# Patient Record
Sex: FEMALE | Race: WHITE | Employment: OTHER | URBAN - METROPOLITAN AREA
[De-identification: names, ages, dates, MRNs, and addresses within clinical notes are randomized per-mention and may not be internally consistent; named-entity substitution may affect disease eponyms.]

---

## 2020-11-03 ENCOUNTER — HOSPITAL ENCOUNTER (INPATIENT)
Facility: CLINIC | Age: 75
LOS: 7 days | Discharge: HOME | DRG: 871 | End: 2020-11-10
Attending: EMERGENCY MEDICINE | Admitting: INTERNAL MEDICINE
Payer: MEDICARE

## 2020-11-03 ENCOUNTER — APPOINTMENT (OUTPATIENT)
Dept: CARDIOLOGY | Facility: CLINIC | Age: 75
DRG: 871 | End: 2020-11-03
Attending: INTERNAL MEDICINE
Payer: MEDICARE

## 2020-11-03 ENCOUNTER — APPOINTMENT (OUTPATIENT)
Dept: GENERAL RADIOLOGY | Facility: CLINIC | Age: 75
DRG: 871 | End: 2020-11-03
Attending: EMERGENCY MEDICINE
Payer: MEDICARE

## 2020-11-03 DIAGNOSIS — I21.4 NSTEMI (NON-ST ELEVATED MYOCARDIAL INFARCTION) (H): ICD-10-CM

## 2020-11-03 LAB
ALBUMIN SERPL-MCNC: 3.6 G/DL (ref 3.4–5)
ALP SERPL-CCNC: 53 U/L (ref 40–150)
ALT SERPL W P-5'-P-CCNC: 18 U/L (ref 0–50)
ANION GAP SERPL CALCULATED.3IONS-SCNC: 8 MMOL/L (ref 3–14)
APTT PPP: 26 SEC (ref 22–37)
APTT PPP: 61 SEC (ref 22–37)
AST SERPL W P-5'-P-CCNC: 18 U/L (ref 0–45)
BASOPHILS # BLD AUTO: 0 10E9/L (ref 0–0.2)
BASOPHILS NFR BLD AUTO: 0.4 %
BILIRUB SERPL-MCNC: 0.4 MG/DL (ref 0.2–1.3)
BUN SERPL-MCNC: 25 MG/DL (ref 7–30)
CALCIUM SERPL-MCNC: 9 MG/DL (ref 8.5–10.1)
CHLORIDE SERPL-SCNC: 106 MMOL/L (ref 94–109)
CO2 SERPL-SCNC: 27 MMOL/L (ref 20–32)
CREAT SERPL-MCNC: 1.06 MG/DL (ref 0.52–1.04)
CRP SERPL-MCNC: 8.7 MG/L (ref 0–8)
D DIMER PPP FEU-MCNC: 0.7 UG/ML FEU (ref 0–0.5)
DIFFERENTIAL METHOD BLD: NORMAL
EOSINOPHIL # BLD AUTO: 0.1 10E9/L (ref 0–0.7)
EOSINOPHIL NFR BLD AUTO: 0.5 %
ERYTHROCYTE [DISTWIDTH] IN BLOOD BY AUTOMATED COUNT: 12.6 % (ref 10–15)
ERYTHROCYTE [DISTWIDTH] IN BLOOD BY AUTOMATED COUNT: 12.9 % (ref 10–15)
FERRITIN SERPL-MCNC: 42 NG/ML (ref 8–252)
GFR SERPL CREATININE-BSD FRML MDRD: 51 ML/MIN/{1.73_M2}
GLUCOSE SERPL-MCNC: 123 MG/DL (ref 70–99)
HCT VFR BLD AUTO: 37 % (ref 35–47)
HCT VFR BLD AUTO: 43.1 % (ref 35–47)
HGB BLD-MCNC: 12.1 G/DL (ref 11.7–15.7)
HGB BLD-MCNC: 13.6 G/DL (ref 11.7–15.7)
IMM GRANULOCYTES # BLD: 0 10E9/L (ref 0–0.4)
IMM GRANULOCYTES NFR BLD: 0.2 %
INTERPRETATION ECG - MUSE: NORMAL
LABORATORY COMMENT REPORT: NORMAL
LDH SERPL L TO P-CCNC: 190 U/L (ref 81–234)
LIPASE SERPL-CCNC: 119 U/L (ref 73–393)
LYMPHOCYTES # BLD AUTO: 1 10E9/L (ref 0.8–5.3)
LYMPHOCYTES NFR BLD AUTO: 10.2 %
MCH RBC QN AUTO: 28.4 PG (ref 26.5–33)
MCH RBC QN AUTO: 28.7 PG (ref 26.5–33)
MCHC RBC AUTO-ENTMCNC: 31.6 G/DL (ref 31.5–36.5)
MCHC RBC AUTO-ENTMCNC: 32.7 G/DL (ref 31.5–36.5)
MCV RBC AUTO: 88 FL (ref 78–100)
MCV RBC AUTO: 90 FL (ref 78–100)
MONOCYTES # BLD AUTO: 0.4 10E9/L (ref 0–1.3)
MONOCYTES NFR BLD AUTO: 3.6 %
NEUTROPHILS # BLD AUTO: 8.2 10E9/L (ref 1.6–8.3)
NEUTROPHILS NFR BLD AUTO: 85.1 %
NRBC # BLD AUTO: 0 10*3/UL
NRBC BLD AUTO-RTO: 0 /100
PLATELET # BLD AUTO: 251 10E9/L (ref 150–450)
PLATELET # BLD AUTO: 269 10E9/L (ref 150–450)
POTASSIUM SERPL-SCNC: 4 MMOL/L (ref 3.4–5.3)
PROCALCITONIN SERPL-MCNC: 2.42 NG/ML
PROT SERPL-MCNC: 7.4 G/DL (ref 6.8–8.8)
RBC # BLD AUTO: 4.21 10E12/L (ref 3.8–5.2)
RBC # BLD AUTO: 4.79 10E12/L (ref 3.8–5.2)
SARS-COV-2 RNA SPEC QL NAA+PROBE: NEGATIVE
SARS-COV-2 RNA SPEC QL NAA+PROBE: NORMAL
SODIUM SERPL-SCNC: 141 MMOL/L (ref 133–144)
SPECIMEN SOURCE: NORMAL
SPECIMEN SOURCE: NORMAL
TROPONIN I SERPL-MCNC: 0.79 UG/L (ref 0–0.04)
TROPONIN I SERPL-MCNC: 5.61 UG/L (ref 0–0.04)
TROPONIN I SERPL-MCNC: 8.36 UG/L (ref 0–0.04)
TROPONIN I SERPL-MCNC: 8.48 UG/L (ref 0–0.04)
TROPONIN I SERPL-MCNC: 8.54 UG/L (ref 0–0.04)
UFH PPP CHRO-ACNC: 0.37 IU/ML
UFH PPP CHRO-ACNC: 0.4 IU/ML
UFH PPP CHRO-ACNC: 0.63 IU/ML
WBC # BLD AUTO: 8 10E9/L (ref 4–11)
WBC # BLD AUTO: 9.6 10E9/L (ref 4–11)

## 2020-11-03 PROCEDURE — 85027 COMPLETE CBC AUTOMATED: CPT | Performed by: INTERNAL MEDICINE

## 2020-11-03 PROCEDURE — 36415 COLL VENOUS BLD VENIPUNCTURE: CPT | Performed by: HOSPITALIST

## 2020-11-03 PROCEDURE — 80053 COMPREHEN METABOLIC PANEL: CPT | Performed by: EMERGENCY MEDICINE

## 2020-11-03 PROCEDURE — 85379 FIBRIN DEGRADATION QUANT: CPT | Performed by: EMERGENCY MEDICINE

## 2020-11-03 PROCEDURE — 71045 X-RAY EXAM CHEST 1 VIEW: CPT

## 2020-11-03 PROCEDURE — 96365 THER/PROPH/DIAG IV INF INIT: CPT

## 2020-11-03 PROCEDURE — 250N000013 HC RX MED GY IP 250 OP 250 PS 637: Performed by: EMERGENCY MEDICINE

## 2020-11-03 PROCEDURE — 85520 HEPARIN ASSAY: CPT | Performed by: INTERNAL MEDICINE

## 2020-11-03 PROCEDURE — 120N000001 HC R&B MED SURG/OB

## 2020-11-03 PROCEDURE — 85520 HEPARIN ASSAY: CPT | Performed by: EMERGENCY MEDICINE

## 2020-11-03 PROCEDURE — 36415 COLL VENOUS BLD VENIPUNCTURE: CPT | Performed by: INTERNAL MEDICINE

## 2020-11-03 PROCEDURE — 86140 C-REACTIVE PROTEIN: CPT | Performed by: EMERGENCY MEDICINE

## 2020-11-03 PROCEDURE — 250N000013 HC RX MED GY IP 250 OP 250 PS 637: Performed by: INTERNAL MEDICINE

## 2020-11-03 PROCEDURE — 93005 ELECTROCARDIOGRAM TRACING: CPT

## 2020-11-03 PROCEDURE — 93306 TTE W/DOPPLER COMPLETE: CPT | Mod: 26 | Performed by: INTERNAL MEDICINE

## 2020-11-03 PROCEDURE — 87186 SC STD MICRODIL/AGAR DIL: CPT | Performed by: HOSPITALIST

## 2020-11-03 PROCEDURE — U0003 INFECTIOUS AGENT DETECTION BY NUCLEIC ACID (DNA OR RNA); SEVERE ACUTE RESPIRATORY SYNDROME CORONAVIRUS 2 (SARS-COV-2) (CORONAVIRUS DISEASE [COVID-19]), AMPLIFIED PROBE TECHNIQUE, MAKING USE OF HIGH THROUGHPUT TECHNOLOGIES AS DESCRIBED BY CMS-2020-01-R: HCPCS | Performed by: EMERGENCY MEDICINE

## 2020-11-03 PROCEDURE — 82728 ASSAY OF FERRITIN: CPT | Performed by: EMERGENCY MEDICINE

## 2020-11-03 PROCEDURE — 84484 ASSAY OF TROPONIN QUANT: CPT | Performed by: INTERNAL MEDICINE

## 2020-11-03 PROCEDURE — 96375 TX/PRO/DX INJ NEW DRUG ADDON: CPT

## 2020-11-03 PROCEDURE — 84145 PROCALCITONIN (PCT): CPT | Performed by: HOSPITALIST

## 2020-11-03 PROCEDURE — 36415 COLL VENOUS BLD VENIPUNCTURE: CPT | Performed by: EMERGENCY MEDICINE

## 2020-11-03 PROCEDURE — 84484 ASSAY OF TROPONIN QUANT: CPT | Performed by: HOSPITALIST

## 2020-11-03 PROCEDURE — 258N000003 HC RX IP 258 OP 636: Performed by: EMERGENCY MEDICINE

## 2020-11-03 PROCEDURE — 83615 LACTATE (LD) (LDH) ENZYME: CPT | Performed by: EMERGENCY MEDICINE

## 2020-11-03 PROCEDURE — 83690 ASSAY OF LIPASE: CPT | Performed by: EMERGENCY MEDICINE

## 2020-11-03 PROCEDURE — 85730 THROMBOPLASTIN TIME PARTIAL: CPT | Performed by: INTERNAL MEDICINE

## 2020-11-03 PROCEDURE — 84484 ASSAY OF TROPONIN QUANT: CPT | Performed by: EMERGENCY MEDICINE

## 2020-11-03 PROCEDURE — 96366 THER/PROPH/DIAG IV INF ADDON: CPT

## 2020-11-03 PROCEDURE — 96361 HYDRATE IV INFUSION ADD-ON: CPT

## 2020-11-03 PROCEDURE — 250N000011 HC RX IP 250 OP 636: Performed by: EMERGENCY MEDICINE

## 2020-11-03 PROCEDURE — 96376 TX/PRO/DX INJ SAME DRUG ADON: CPT

## 2020-11-03 PROCEDURE — 85520 HEPARIN ASSAY: CPT | Performed by: HOSPITALIST

## 2020-11-03 PROCEDURE — 85025 COMPLETE CBC W/AUTO DIFF WBC: CPT | Performed by: EMERGENCY MEDICINE

## 2020-11-03 PROCEDURE — 99222 1ST HOSP IP/OBS MODERATE 55: CPT | Mod: 25 | Performed by: INTERNAL MEDICINE

## 2020-11-03 PROCEDURE — C9803 HOPD COVID-19 SPEC COLLECT: HCPCS

## 2020-11-03 PROCEDURE — 87040 BLOOD CULTURE FOR BACTERIA: CPT | Performed by: HOSPITALIST

## 2020-11-03 PROCEDURE — 99223 1ST HOSP IP/OBS HIGH 75: CPT | Mod: AI | Performed by: INTERNAL MEDICINE

## 2020-11-03 PROCEDURE — 99285 EMERGENCY DEPT VISIT HI MDM: CPT | Mod: 25

## 2020-11-03 PROCEDURE — 250N000011 HC RX IP 250 OP 636: Performed by: HOSPITALIST

## 2020-11-03 PROCEDURE — 87800 DETECT AGNT MULT DNA DIREC: CPT | Performed by: HOSPITALIST

## 2020-11-03 PROCEDURE — 255N000002 HC RX 255 OP 636: Performed by: EMERGENCY MEDICINE

## 2020-11-03 PROCEDURE — 250N000013 HC RX MED GY IP 250 OP 250 PS 637: Performed by: HOSPITALIST

## 2020-11-03 PROCEDURE — 85730 THROMBOPLASTIN TIME PARTIAL: CPT | Performed by: EMERGENCY MEDICINE

## 2020-11-03 PROCEDURE — 87077 CULTURE AEROBIC IDENTIFY: CPT | Performed by: HOSPITALIST

## 2020-11-03 RX ORDER — IBUPROFEN 600 MG/1
600 TABLET, FILM COATED ORAL EVERY 6 HOURS PRN
Status: ON HOLD | COMMUNITY
End: 2020-11-10

## 2020-11-03 RX ORDER — ROSUVASTATIN CALCIUM 20 MG/1
40 TABLET, COATED ORAL DAILY
Status: DISCONTINUED | OUTPATIENT
Start: 2020-11-03 | End: 2020-11-10 | Stop reason: HOSPADM

## 2020-11-03 RX ORDER — ONDANSETRON 2 MG/ML
4 INJECTION INTRAMUSCULAR; INTRAVENOUS EVERY 6 HOURS PRN
Status: DISCONTINUED | OUTPATIENT
Start: 2020-11-03 | End: 2020-11-10 | Stop reason: HOSPADM

## 2020-11-03 RX ORDER — ACETAMINOPHEN 325 MG/10.15ML
650 LIQUID ORAL EVERY 4 HOURS PRN
Status: DISCONTINUED | OUTPATIENT
Start: 2020-11-03 | End: 2020-11-10 | Stop reason: HOSPADM

## 2020-11-03 RX ORDER — ONDANSETRON 2 MG/ML
4 INJECTION INTRAMUSCULAR; INTRAVENOUS ONCE
Status: COMPLETED | OUTPATIENT
Start: 2020-11-03 | End: 2020-11-03

## 2020-11-03 RX ORDER — ONDANSETRON 4 MG/1
4 TABLET, ORALLY DISINTEGRATING ORAL EVERY 6 HOURS PRN
Status: DISCONTINUED | OUTPATIENT
Start: 2020-11-03 | End: 2020-11-10 | Stop reason: HOSPADM

## 2020-11-03 RX ORDER — HEPARIN SODIUM 10000 [USP'U]/100ML
0-5000 INJECTION, SOLUTION INTRAVENOUS CONTINUOUS
Status: DISCONTINUED | OUTPATIENT
Start: 2020-11-03 | End: 2020-11-06

## 2020-11-03 RX ORDER — METOPROLOL TARTRATE 25 MG/1
12.5 TABLET, FILM COATED ORAL 2 TIMES DAILY
Status: ON HOLD | COMMUNITY
End: 2020-11-10

## 2020-11-03 RX ORDER — HEPARIN SODIUM 10000 [USP'U]/100ML
0-5000 INJECTION, SOLUTION INTRAVENOUS CONTINUOUS
Status: DISCONTINUED | OUTPATIENT
Start: 2020-11-03 | End: 2020-11-03

## 2020-11-03 RX ORDER — AMOXICILLIN 250 MG
2 CAPSULE ORAL 2 TIMES DAILY
Status: DISCONTINUED | OUTPATIENT
Start: 2020-11-03 | End: 2020-11-10 | Stop reason: HOSPADM

## 2020-11-03 RX ORDER — CYCLOBENZAPRINE HCL 10 MG
10 TABLET ORAL DAILY PRN
COMMUNITY

## 2020-11-03 RX ORDER — METOPROLOL TARTRATE 25 MG/1
25 TABLET, FILM COATED ORAL 2 TIMES DAILY
Status: DISCONTINUED | OUTPATIENT
Start: 2020-11-03 | End: 2020-11-04

## 2020-11-03 RX ORDER — ASPIRIN 81 MG/1
81 TABLET ORAL DAILY
Status: DISCONTINUED | OUTPATIENT
Start: 2020-11-04 | End: 2020-11-03 | Stop reason: ALTCHOICE

## 2020-11-03 RX ORDER — DOCUSATE SODIUM 100 MG/1
100 CAPSULE, LIQUID FILLED ORAL DAILY PRN
COMMUNITY

## 2020-11-03 RX ORDER — LISINOPRIL 40 MG/1
40 TABLET ORAL DAILY
Status: DISCONTINUED | OUTPATIENT
Start: 2020-11-03 | End: 2020-11-04

## 2020-11-03 RX ORDER — LISINOPRIL 40 MG/1
40 TABLET ORAL DAILY
Status: ON HOLD | COMMUNITY
End: 2020-11-10

## 2020-11-03 RX ORDER — ASPIRIN 81 MG/1
324 TABLET, CHEWABLE ORAL ONCE
Status: COMPLETED | OUTPATIENT
Start: 2020-11-03 | End: 2020-11-03

## 2020-11-03 RX ORDER — LIDOCAINE 40 MG/G
CREAM TOPICAL
Status: DISCONTINUED | OUTPATIENT
Start: 2020-11-03 | End: 2020-11-07

## 2020-11-03 RX ORDER — POLYETHYLENE GLYCOL 3350 17 G/17G
17 POWDER, FOR SOLUTION ORAL DAILY
Status: DISCONTINUED | OUTPATIENT
Start: 2020-11-03 | End: 2020-11-10 | Stop reason: HOSPADM

## 2020-11-03 RX ORDER — AMOXICILLIN 250 MG
1 CAPSULE ORAL 2 TIMES DAILY
Status: DISCONTINUED | OUTPATIENT
Start: 2020-11-03 | End: 2020-11-10 | Stop reason: HOSPADM

## 2020-11-03 RX ORDER — CEFTRIAXONE 1 G/1
1 INJECTION, POWDER, FOR SOLUTION INTRAMUSCULAR; INTRAVENOUS EVERY 24 HOURS
Status: DISCONTINUED | OUTPATIENT
Start: 2020-11-03 | End: 2020-11-04

## 2020-11-03 RX ORDER — ACETAMINOPHEN 325 MG/10.15ML
640 LIQUID ORAL ONCE
Status: COMPLETED | OUTPATIENT
Start: 2020-11-03 | End: 2020-11-03

## 2020-11-03 RX ADMIN — ACETAMINOPHEN 650 MG: 160 LIQUID ORAL at 20:41

## 2020-11-03 RX ADMIN — ASPIRIN 325 MG: 325 TABLET, COATED ORAL at 17:08

## 2020-11-03 RX ADMIN — METOPROLOL TARTRATE 25 MG: 25 TABLET, FILM COATED ORAL at 20:41

## 2020-11-03 RX ADMIN — ROSUVASTATIN CALCIUM 40 MG: 20 TABLET, FILM COATED ORAL at 16:11

## 2020-11-03 RX ADMIN — CEFTRIAXONE 1 G: 1 INJECTION, POWDER, FOR SOLUTION INTRAMUSCULAR; INTRAVENOUS at 16:29

## 2020-11-03 RX ADMIN — HUMAN ALBUMIN MICROSPHERES AND PERFLUTREN 3 ML: 10; .22 INJECTION, SOLUTION INTRAVENOUS at 08:20

## 2020-11-03 RX ADMIN — ASPIRIN 324 MG: 81 TABLET, CHEWABLE ORAL at 01:36

## 2020-11-03 RX ADMIN — HEPARIN SODIUM 1000 UNITS/HR: 10000 INJECTION, SOLUTION INTRAVENOUS at 01:34

## 2020-11-03 RX ADMIN — SODIUM CHLORIDE 1000 ML: 9 INJECTION, SOLUTION INTRAVENOUS at 01:12

## 2020-11-03 RX ADMIN — ACETAMINOPHEN 640 MG: 325 SOLUTION ORAL at 01:12

## 2020-11-03 RX ADMIN — ONDANSETRON 4 MG: 2 INJECTION INTRAMUSCULAR; INTRAVENOUS at 01:12

## 2020-11-03 RX ADMIN — LISINOPRIL 40 MG: 40 TABLET ORAL at 16:11

## 2020-11-03 ASSESSMENT — ACTIVITIES OF DAILY LIVING (ADL)
FALL_HISTORY_WITHIN_LAST_SIX_MONTHS: NO
ADLS_ACUITY_SCORE: 14
ADLS_ACUITY_SCORE: 16

## 2020-11-03 ASSESSMENT — ENCOUNTER SYMPTOMS
SHORTNESS OF BREATH: 0
DIARRHEA: 0
FEVER: 0
VOMITING: 1
NAUSEA: 1
COUGH: 0

## 2020-11-03 ASSESSMENT — MIFFLIN-ST. JEOR: SCORE: 1333.2

## 2020-11-03 NOTE — ED TRIAGE NOTES
Pt arrives via EMS with nausea and fever. Pt was eating spicy food at dinner and started having upper abdominal pain and chills. Given 4mg zofran en route. VSS, A&O x4.

## 2020-11-03 NOTE — PLAN OF CARE
Diagnosis. NSTEMI  History. CAD, HTN, Hyperlip  Labs/Protocol. Troponin 8.485,8.544 MD paged. No action necessary. Patient on Heparin and scheduled for angiogram tomorrow..   Vitals. VSS  Tele. SR with long QT  Respiratory. WDL  Neuro. WDL  GI/. WDL  Skin. Patient   LDA's. Left PIV CDI. Infusing Heparin at 800 units/hr. Hep 10A 0.37. Maintained same dose per protocol.   Diet. Low Sat fat <2400mg.   Activity. Assist of 1 with GB.   Plan. Patient planning to have angiogram tomorrow. NPO at midnight. Patient received dose of aspirin tonight in preparation. Continue to monitor Troponins and Hep10A.     Paged MD:  FYI: Patients Troponin 8.485  FYI: Patients Troponin 8.544

## 2020-11-03 NOTE — ED NOTES
Patient up to bedside commode with SBA. Echo in room to complete. Pt denies chest pain. Breathing WNL.

## 2020-11-03 NOTE — ED NOTES
Per Dr. Contreras pt ok to drink a little  Patient supplied with water, some ice chips.  Also requesting chapstick, and feminine pads.     Commode at bedside

## 2020-11-03 NOTE — ED NOTES
Patient complaining of increased chills. Temperature 98.2 orally. Bear hugger applied. Cardiologist in room to assess patient. After about 20 minutes, patient was feeling better and warmed up. VSS. Denies chest pain. Per cardiologist, patient okay to have some water. Water given, pillows adjusted.

## 2020-11-03 NOTE — ED NOTES
DATE:  11/3/2020   TIME OF RECEIPT FROM LAB:  0123  LAB TEST:  Troponin  LAB VALUE:  0.791  RESULTS GIVEN WITH READ-BACK TO (PROVIDER):  Haap  TIME LAB VALUE REPORTED TO PROVIDER:   0124

## 2020-11-03 NOTE — PROGRESS NOTES
"Brief Progress Note:    75-year-old female with known CAD, hypertension, hyperlipidemia who presents with shaking chills and nausea.  Symptoms started about an hour prior to going to the emergency room.  Patient does endorse increased frequency of urination but no dysuria.  EKG done shows no acute ST elevation but troponin uptrending.  She does not have any chest pain.  Does not feel like symptoms she had with prior MI.  She did last have a coronary angiogram in 2014 that showed nonocclusive disease.    Vitals:    11/03/20 1300 11/03/20 1315 11/03/20 1330 11/03/20 1354   BP: 139/68 128/66 (!) 142/75 (!) 144/79   BP Location:    Right arm   Pulse: 79 79 78 80   Resp:   18 18   Temp:    100.2  F (37.9  C)   TempSrc:    Axillary   SpO2: 96% 96% 98% 97%   Weight:    79.5 kg (175 lb 3.2 oz)   Height:    1.727 m (5' 8\")     Exam, patient appears fatigued.  Heart is regular with no murmur.  Lungs are clear.  Abdomen is soft.  No CVA tenderness.  Extremities warm and well-perfused.  Neuro exam is nonfocal.    Chest x-ray showed no acute CV process.  CBC shows no leukocytosis.  BMP unremarkable.  COVID-19 is negative.    TTE shows EF 45 to 50% with moderate to severe hypokinesis of the mid anteroseptal walls and mid to apical anterior walls.  RV systolic function is normal.    Plan:  -Treating for ACS.  Heparin drip, aspirin, statin, beta-blockade.  Continuing home lisinopril.  Cards consulted.  They are planning on cath tomorrow.  N.p.o. at midnight.  Continue telemetry.  -I have ordered a UA.  Cultures.  I have also ordered a procalcitonin.  May be a viral infection at play.  Give a single dose of ceftriaxone and follow-up on procalcitonin, UA.    Helder Farooq MD    "

## 2020-11-03 NOTE — ED PROVIDER NOTES
History     Chief Complaint:  Nausea and Fever       HPI   Hue Montanez is a 75 year old female with a history of MI, hypertension, hyperlipidemia, and GERD who presents with nausea and fever. The patient was eating spicy food at dinner last night when about 2 hours later she started having upper abdominal pain and chills. EMS was called and the patient was subsequently transferred to the ED. She was given 4 mg zofran en route.  Here, she notes that the chills made her shake, but she denies feeling like she had a fever.  She denies cough, but admits to some congestion.  She reports a history of MI that was treated at [facility] through the [facility] system, but that her cardiac cath at the time did not show any blockages.    Allergies:  Chlorthalidone   Lactose     Medications:    Lisinopril   Metoprolol   Hydrochlorothiazide     Past Medical History:    Pure hypercholesterolemia   GERD  Chronic back pain   IBS  Hypertension   MI    Past Surgical History:    Lumbar spine cord surgery   Appendectomy   Cholecystectomy   NADER and BSP  Lipectomy   Eye surgery     Family History:    Cancer   Diabetes   Hypertension   Heart disease     Social History:  Smoking Status: Never Smoker  Smokeless Tobacco: Never Used  Alcohol Use: No    Review of Systems   Constitutional: Negative for fever.   HENT: Positive for congestion.    Respiratory: Negative for cough and shortness of breath.    Cardiovascular: Negative for chest pain.   Gastrointestinal: Positive for nausea and vomiting. Negative for diarrhea.   All other systems reviewed and are negative.      Physical Exam     Patient Vitals for the past 24 hrs:   BP Temp Temp src Pulse Resp SpO2 Weight   11/03/20 0350 -- -- -- 73 13 96 % --   11/03/20 0335 105/65 -- -- 75 -- 96 % --   11/03/20 0320 -- -- -- 86 -- -- --   11/03/20 0145 138/78 -- -- 101 -- 95 % --   11/03/20 0130 -- -- -- 101 -- -- --   11/03/20 0115 -- -- -- 99 28 94 % --   11/03/20 0100 -- -- -- 98 21 93 % --    11/03/20 0045 (!) 165/86 -- -- 98 13 92 % --   11/03/20 0040 (!) 165/86 99.3  F (37.4  C) Oral 106 18 94 % 83 kg (183 lb)        Physical Exam  Nursing note and vitals reviewed.  Constitutional: Cooperative.   HENT:   Mouth/Throat: Moist mucous membranes.   Eyes: EOMI, nonicteric sclera  Cardiovascular: Normal rate, regular rhythm, no murmurs, rubs, or gallops  Pulmonary/Chest: Effort normal and breath sounds normal. No respiratory distress. No wheezes. No rales.   Abdominal: Soft. Nontender, nondistended, no guarding or rigidity.   Musculoskeletal: Normal range of motion.   Neurological: Alert. Moves all extremities spontaneously.   Skin: Skin is warm and dry. No rash noted.   Psychiatric: Normal mood and affect.       Emergency Department Course   ECG:  ECG taken at 0041, ECG read at 0126  Sinus tachycardia   Minimal voltage criteria for LVH, may be normal variant   Nonspecific ST and T wave abnormality   Abnormal ECG  Rate 102 bpm. SD interval 166 ms. QRS duration 78 ms. QT/QTc 352/458 ms. P-R-T axes 59 -4 81.     Imaging:  Radiology findings were communicated with the patient who voiced understanding of the findings.    XR Chest Port 1 View  Final Result  IMPRESSION: Cardiomediastinal silhouette is within normal limits. Slight elevation left hemidiaphragm. No focal consolidation or pleural effusion.  Reading per radiology     Laboratory:  Laboratory findings were communicated with the patient who voiced understanding of the findings.    CMP: Glucose 123 (H), GFR 51 (L), o/w WNL (Creatinine: 1.06 (H))     Lipase: 119     CBC:  WBC 9.6, HGB 12.1, , o/w WNL       Troponin(0045):  0.791 (HH)    Symptomatic COVID-19 Virus (Coronavirus) by PCR Nasopharyngeal swab: pending      PTT: 26    D dimer quantitative: 0.7 (H)      CRP inflammation: 8.7 (H)    Ferritin: 42    Lactate dehydrogenase: 190     Interventions:  Medications   heparin 25,000 units in 0.45% NaCl 250 mL ANTICOAGULANT  infusion (1,000 Units/hr  Intravenous Rate/Dose Verify 11/3/20 0350)   0.9% sodium chloride BOLUS (0 mLs Intravenous Stopped 11/3/20 0334)   ondansetron (ZOFRAN) injection 4 mg (4 mg Intravenous Given 11/3/20 0112)   acetaminophen (TYLENOL) solution 640 mg (640 mg Oral Given 11/3/20 0112)   aspirin (ASA) chewable tablet 324 mg (324 mg Oral Given 11/3/20 0136)   heparin loading dose for LOW INTENSITY TREATMENT * Give BEFORE starting heparin infusion (5,000 Units Intravenous Given 11/3/20 0133)      Emergency Department Course:  Past medical records, nursing notes, and vitals reviewed.    0047 I performed an exam of the patient as documented above.    IV was inserted and blood was drawn for laboratory testing, results above.      0130 Patient rechecked and updated.     The patient was sent for imaging while in the emergency department, results above.       0216 I spoke with Dr. Soliz of the Hospitalist service from [facility] regarding patient's presentation, findings, and plan of care.      0316 I spoke with Dr. Fregoso of the Hospitalist service from [facility] regarding patient's presentation, findings, and plan of care.       Findings and plan explained to the Patient who consents to admission. Discussed the patient with Dr. Fregoso, who will admit the patient to a cardiac tele bed for further monitoring, evaluation, and treatment.        Impression & Plan   Covid-19  Hue Montanez was evaluated during a global COVID-19 pandemic, which necessitated consideration that the patient might be at risk for infection with the SARS-CoV-2 virus that causes COVID-19.   Applicable protocols for evaluation were followed during the patient's care.   COVID-19 was considered as part of the patient's evaluation. The plan for testing is:  a test was obtained during this visit.      Medical Decision Making:  Hue Montanez is a 75 year old female who presents to the emergency department today with chief complaint of nausea and chills.  Broad  differential considered.  She had nonspecific EKG changes.  Troponin was elevated suggesting NSTEMI.  Also considered that this could be COVID-19.  Using age-adjusted cut off, D-dimer is negative.  Chest x-ray shows no infiltrates or other source of symptoms.  Patient was given aspirin and initiated on heparin.  She is largely pain-free here in ED.  Discussed the above with her and she voices understanding and all questions were answered.  Discussed with Dr. Fregoso from our hospitalist service who accepts her for admission.  She is admitted in stable condition.      Discharge Diagnosis:    ICD-10-CM    1. NSTEMI (non-ST elevated myocardial infarction) (H)  I21.4 Symptomatic COVID-19 Virus (Coronavirus) by PCR       Disposition:  Admitted.     Scribe Disclosure:  Manish WING, am serving as a scribe at 12:47 AM on 11/3/2020 to document services personally performed by Ezequiel Contreras MD based on my observations and the provider's statements to me.      11/3/2020   Ezequiel Contreras MD [place], Lucas Ray, MD  11/03/20 0814

## 2020-11-03 NOTE — CONSULTS
Cardiology consultation dictated    Assessment : NSTEMI in setting of nausea, chills and malaise. Unusual presentation as NO chest pain. Hemodynamically stable    Recommendations  1) Randell with IVUFH  2) carvedilol 12.5 bid  3) lisinopril 20 mg daily  4) atorvastatin 40 mg daily  5) evaluate for systemic illness eg UTI or infectious source    Eventual coronary angiogram with possible PCI depending upon her course. Would avoid today until we clarify whether she has an infection unless unstable.    I have examined the patient, reviewed the history, medications and pre procedural tests. I have explained to the patient the risks of death, MI, stroke, hematoma, possible urgent bypass surgery for failed PCI, use of stents, thienopyridine agents, possible peripheral vascular complications, arrhythmia, the use of FFR in clinical decision-making and alternative of medical therapy alone in regards to left heart catheterization, left ventriculography, coronary angiography, and possible percutaneous coronary intervention. The patient voiced understanding and wishes to proceed. The patient has a good right radial pulse, normal ulnar pulse and a normal Gaston's sign.

## 2020-11-03 NOTE — ED NOTES
[facility]  ED Nurse Handoff Report    Hue Montanez is a 75 year old female   ED Chief complaint: Nausea and Fever  . ED Diagnosis:   Final diagnoses:   NSTEMI (non-ST elevated myocardial infarction) (H)     Allergies:   Allergies   Allergen Reactions     Chlorthalidone GI Disturbance     Lactose        Code Status: Full Code - not on file  Activity level - Baseline/Home:  Independent. Activity Level - Current:   Assist X 1. Lift room needed: No. Bariatric: No   Needed: No   Isolation: Yes. Infection: Not Applicable  COVID r/o and special precautions.     Vital Signs:   Vitals:    11/03/20 1115 11/03/20 1130 11/03/20 1145 11/03/20 1215   BP: (!) 143/80 (!) 146/83 (!) 146/78    Pulse: 81 82 84 81   Resp: 11 11   Temp:       TempSrc:       SpO2: 93% 92% 94% 99%   Weight:           Cardiac Rhythm:  ,      Pain level: 0-10 Pain Scale: 0  Patient confused: No. Patient Falls Risk: Yes.   Elimination Status: Has voided   Patient Report - Initial Complaint: Nausea and chills. Focused Assessment: Presents with above complaints. Pt had some upper abdominal pain, nausea, chills and believed it to be from spicy food. Pt given Zofran. Labs show critically high troponin. Pt started on heparin gtt. Pt denies any chest pain. 0716 troponin 5.611. Patient to be NPO. ABCs intact. Pt A&OX4.   Patient's heparin gtt was paused for 60 minutes and restarted at 800 units/hr. Next heparin unfractioned anti xa due at 1634 and order is in. Pt given boxed lunch in ED.   Tests Performed: labs, chest xray. echocardiogram. Abnormal Results:   Labs Ordered and Resulted from Time of ED Arrival Up to the Time of Departure from the ED   COMPREHENSIVE METABOLIC PANEL - Abnormal; Notable for the following components:       Result Value    Glucose 123 (*)     Creatinine 1.06 (*)     GFR Estimate 51 (*)     GFR Estimate If Black 59 (*)     All other components within normal limits   TROPONIN I - Abnormal; Notable for the  following components:    Troponin I ES 0.791 (*)     All other components within normal limits   D DIMER QUANTITATIVE - Abnormal; Notable for the following components:    D Dimer 0.7 (*)     All other components within normal limits   CRP INFLAMMATION - Abnormal; Notable for the following components:    CRP Inflammation 8.7 (*)     All other components within normal limits   TROPONIN I - Abnormal; Notable for the following components:    Troponin I ES 5.611 (*)     All other components within normal limits   LIPASE   CBC WITH PLATELETS DIFFERENTIAL   COVID-19 VIRUS (CORONAVIRUS) BY PCR   PARTIAL THROMBOPLASTIN TIME   FERRITIN   LACTATE DEHYDROGENASE   SARS-COV-2 (COVID-19) VIRUS RT-PCR   HEPARIN UNFRACTIONATED ANTI XA LEVEL   MEASURE WEIGHT     Echocardiogram Complete   Final Result      XR Chest Port 1 View   Final Result   IMPRESSION: Cardiomediastinal silhouette is within normal limits. Slight elevation left hemidiaphragm. No focal consolidation or pleural effusion.        .   Treatments provided: See MAR  Family Comments: No family here.   OBS brochure/video discussed/provided to patient:  N/A  ED Medications:   Medications   heparin 25,000 units in 0.45% NaCl 250 mL ANTICOAGULANT  infusion (800 Units/hr Intravenous Restarted 11/3/20 1034)   0.9% sodium chloride BOLUS (0 mLs Intravenous Stopped 11/3/20 0334)   ondansetron (ZOFRAN) injection 4 mg (4 mg Intravenous Given 11/3/20 0112)   acetaminophen (TYLENOL) solution 640 mg (640 mg Oral Given 11/3/20 0112)   aspirin (ASA) chewable tablet 324 mg (324 mg Oral Given 11/3/20 0136)   heparin loading dose for LOW INTENSITY TREATMENT * Give BEFORE starting heparin infusion (5,000 Units Intravenous Given 11/3/20 0133)   perflutren diluted 1mL to 2mL with saline (OPTISON) diluted injection 3 mL (3 mLs Intravenous Given 11/3/20 0820)   sodium chloride (PF) 0.9% PF flush 10 mL (10 mLs Intravenous Given 11/3/20 0843)     Drips infusing:  Yes - heparin  For the majority of  the shift, the patient's behavior Green. Interventions performed were N/A.    Sepsis treatment initiated: No     Patient tested for COVID 19 prior to admission: YES - negative    ED Nurse Name/Phone Number: Sola Thomas RN,   7:20 AM      RECEIVING UNIT ED HANDOFF REVIEW    Above ED Nurse Handoff Report was reviewed: Yes  Reviewed by: Alana Barnes RN on November 3, 2020 at 1:12 PM

## 2020-11-03 NOTE — CONSULTS
Consult Date:  11/03/2020      CARDIOLOGY CONSULTATION      AGE:  75      HISTORY OF PRESENT ILLNESS:  Hue Montanze, a 75-year-old woman with a previous history of myocardial infarction in 2013, hypertension and dyslipidemia, was evaluated in consultation at the request of Dr. Fregoso and the [facility] Emergency Room for a non  ST segment elevation MI.      Last evening about 2 hours after dinner, at 10:30 p.m., the patient noted the subacute onset of shaking chills, nausea and vomiting.  The patient's symptoms persisted for about an hour, and her son brought her to the emergency room.  ECG showed no acute changes; however, troponin levels matthew from 0.791 on admission to 5.61.  Despite the elevation of troponin levels, the patient denies chest, arm, neck, jaw or back discomfort.  Her current symptoms are unlike those that she experienced in 2013.      The patient was told at the [facility] she sustained a myocardial infarction in 2014; however, coronary angiography showed no significant coronary narrowings.  She has been maintained on blood pressure medications and had been on a statin drug, which was later stopped.  The patient does not describe typical chest, arm, neck, jaw or back discomfort with exertion.  She cannot identify any localizing symptoms to explain chills.  She specifically denied hemoptysis, purulent sputum, dysuria or diarrhea.  The patient, however, did have a urinary tract infection several weeks ago.      PAST MEDICAL HISTORY:   1. Irritable bowel syndrome.   2. Gastroesophageal reflux disease.   3. Hypertension.   4. Dyslipidemia.   5. Previous history of MI had been reported.  Coronary angiography showed no significant narrowing.   6. Status post appendectomy.   7. Status post cholecystectomy.   8. Status post total abdominal hysterectomy with bilateral salpingo-oophorectomy.   9. History of back surgery.      SOCIAL HISTORY:  The patient is .  She has been  for  about 10 years; this is her second marriage.  She has 5 children.  She is no longer working outside the home.      HABITS:  The patient does not use tobacco or abuse alcohol.      FAMILY HISTORY:  Her mother  of a heart attack at a young age.  There has been hypertension, diabetes and some family members with aneurysms.      ALLERGIES:  CHLORTHALIDONE AND LACTOSE.      REVIEW OF SYSTEMS:  A 12-point review of systems was performed.  Outside the issues mentioned in the HPI, there were no other complaints.  Despite the patient's chills and nausea, she did not localize any symptoms such as dysuria, hemoptysis, purulent sputum, pleuritic chest pain.      PHYSICAL EXAMINATION:   GENERAL:  Demonstrates a very pleasant, cooperative and intelligent 75-year-old woman who is in heating blankets at this time.  She stated her chills resumed about half an hour ago, but are markedly improved with the care she is receiving from the nursing staff here.   VITAL SIGNS:  Her blood pressure was 160s/90, her heart rate was 75, respiratory rate 18, O2 sats 100%.   LUNGS:  Clear to percussion and auscultation.   CARDIOVASCULAR:  Shows a normal S1 with a normal S2.  There is no S3.  There is no murmur, rub or click.  Her pulses are symmetrical in the carotid, radial, brachial, femoral, popliteal, dorsalis pedis, posterior tibials.   ABDOMEN:  Bowel sounds are diminished, but present.  Liver and spleen cannot be palpated.   EXTREMITIES:  There is no local tenderness at this time.   NEUROLOGIC:  Cranial nerves II-XII are intact.  Her strength is equal and symmetrical.      LABORATORY DATA:  Her ECG shows a sinus rhythm with voltage criteria for left ventricular hypertrophy and nonspecific ST-T wave changes.  There is somewhat early R-wave transition.      Echocardiogram shows ejection fraction of about 50% with hypokinesis involving the mid anteroseptal wall and the apical anterior wall.  There is no significant valvular stenosis or  insufficiency.  Potassium is 4.0, creatinine is 1.06.  Her liver function studies are unremarkable.  Hemoglobin is 12.1, white blood cell count is 9.6, platelet count is 269,000.      MEDICATIONS:  Intravenous heparin.      HOME MEDICATIONS:  Include aspirin 81 daily, lisinopril 40 mg daily, metoprolol 12.5 b.i.d., cyclobenzaprine (Flexeril).      ASSESSMENT:  This 75-year-old woman with history of hypertension and dyslipidemia presents with a non-ST segment elevation myocardial infarction in the setting of nausea and chills.  There is a reported previous history of myocardial infarction with coronary angiography showing no significant coronary narrowings.  I am concerned that there may be another systemic process as part of her presentation, as she has been absolutely free of chest discomfort.  In addition to standard guideline-directed treatment for acute coronary syndrome, this patient should be cultured and a vigorous effort made to find out whether there is a systemic illness present such ( ef.  Infection)  at this time.      Eventually, the patient should undergo coronary angiography, timing dependent upon her subsequent course. Fortunately at this time she is hemodynamically stable with no cardiac symptoms.      RECOMMENDATIONS:   1. Agree with intravenous heparin.   2. Continue lisinopril.   3. Increase metoprolol to 25 b.i.d.   4. Begin statin agent.   5. Evaluate for possible systemic illness.   6. Diagnostic coronary angiography with possible intervention depending on the patient's course.  We will plan for the invasive procedure tomorrow unless there is some other intercurrent medical illness that requires treatment.      We greatly appreciate the opportunity to be involved in the care of this patient.            BEE GAYTAN MD             D: 2020   T: 2020   MT: BREANNA      Name:     SHAWNA ORTIZ   MRN:      [id number]        Account:       [id number]   :      1945            Consult Date:  11/03/2020      Document: [id number]       cc: Shannan Jeffrey MD

## 2020-11-03 NOTE — PHARMACY-ADMISSION MEDICATION HISTORY
Admission medication history interview status for this patient is complete. See [facility] admission navigator for allergy information, prior to admission medications and immunization status.     Medication history interview done via telephone during Covid-19 pandemic, indicate source(s): Patient  Medication history resources (including written lists, pill bottles, clinic record):[facility] [employer]  Pharmacy: [facility]    Changes made to PTA medication list:  Added:  All meds  Deleted:   Changed:     Actions taken by pharmacist (provider contacted, etc):sticky note md     Additional medication history information:None    Medication reconciliation/reorder completed by provider prior to medication history?  N   (Y/N)         Prior to Admission medications    Medication Sig Last Dose Taking? Auth Provider   aspirin (ASA) 81 MG EC tablet Take 81 mg by mouth daily 11/2/2020 at Unknown time Yes Unknown, Entered By History   cyclobenzaprine (FLEXERIL) 10 MG tablet Take 10 mg by mouth daily as needed for muscle spasms Past Month at Unknown time Yes Unknown, Entered By History   docusate sodium (COLACE) 100 MG capsule Take 100 mg by mouth daily as needed for constipation 11/2/2020 at Unknown time Yes Unknown, Entered By History   ibuprofen (ADVIL/MOTRIN) 600 MG tablet Take 600 mg by mouth every 6 hours as needed for moderate pain  Yes Unknown, Entered By History   lisinopril (ZESTRIL) 40 MG tablet Take 40 mg by mouth daily 11/2/2020 at Unknown time Yes Unknown, Entered By History   metoprolol tartrate (LOPRESSOR) 25 MG tablet Take 12.5 mg by mouth 2 times daily 11/2/2020 at Unknown time Yes Unknown, Entered By History

## 2020-11-03 NOTE — ED NOTES
Patient resting comfortably. Pt cold so given two warm blankets. Thermostat adjusted in room. Denies chest pain.

## 2020-11-03 NOTE — H&P
[facility] History and Physical    Hue Montanez MRN# [id number]   Age: 75 year old YOB: 1945     Date of Admission:  11/3/2020    Home clinic: [facility]  Primary care provider: Shannan Jeffrey          Assessment and Plan:   Assessment:   Hue Montanez is a 75-year-old woman who suffered a heart attack in 2013 (through [place] and she believes that they could not find a lesion on angiography) who came to attention today with rather abrupt onset of shaking chills and vomiting.  Due to the patient's history, a troponin was obtained and was notably positive.      At the time of presentation, the patient was hypertensive (/86) but within about an hour minutes, blood pressure did return to normal.  Vital signs then remain normal: HR 75, blood pressure 105/65, respirations unlabored, oxygen saturation 96% in room air.  Examination is effectively negative.  Although the the emergency room physician records that the patient had rather abrupt onset of abdominal pain, she does not confirm that to me.  She did have shaking chills and became abruptly nauseated but that was evidently without significant pain.  Lab evaluation: Initial troponin 0 0.791, creatinine 1.06 electrolytes and liver function tests normal.  WBC 9.6 with mild left shift, normal lymphocytes, hemoglobin 12.1, platelets 269.  Following the elevated troponin, the patient was admitted with the diagnosis of NSTEMI and started on a heparin drip.  When I evaluated her, she was completely symptom-free.    Prior medical history notable for coronary artery disease, hypertension, dyslipidemia, gastroesophageal reflux, chronic back pain, IBS with constipation.  Previously evaluated for impaired fasting glucose.  Ms. Montanez is a lifelong non-smoker.    Please note that the second troponin is 5.6 and I am currently in the process of calling out to cardiology.  Patient will be kept n.p.o. and formal  cardiology consultation will be obtained.  Stat echocardiogram to the emergency department as well.    Dx:  1.  NSTEMI.  Known coronary artery disease.  2.  Hypertension  3.  Dyslipidemia  4.  Mild hyperglycemia.      Plan:   1.  Admit to inpatient on telemetry.  2.  Continue heparin drip started in the emergency department.  3.  Serial troponins.  4.  N.p.o.    In view of the rapidly rising troponin, and the patient being in the emergency department with an NSTEMI, I called Dr. Olvera and signed out to both him and my partner Dr. Farooq.  As above, I have prepared the patient for angiogram if that is able to be offered today.             Chief Complaint:   Abrupt onset shaking chills and nausea     History is obtained from the patient, emergency room physician and electronic medical record.    Hue Montanez is a 75-year-old woman who was apparently feeling completely fine when she developed abrupt onset of shaking chills and nausea with vomiting following dinner on 11/2/2020.  She had thought that maybe the food disagreed with her, but her son insisted that she come to the emergency department.      She times the onset of discomfort at about 11 PM.  I note that she had her troponin drawn at approximately 0045.  She denies any chest discomfort and indicates she did not have any shortness of breath with this.  She did not have drenching sweats or diarrhea.  She did vomit but not multiple times.  She denies abdominal pain, change in her tolerance for activity or any significant lower extremity weakness or swelling.    She does not have any sick contacts and indicates that she has been in isolation due to the Covid pandemic.        Past Medical History:   MI in 2014 at which time patient tells me she underwent angiography but no lesion could be identified.  Hypertension  Dyslipidemia  IBS, constipation predominant  Gastroesophageal reflux disease  Remote history glucose intolerance         Past Surgical History:    Appendectomy  Cholecystectomy  Total abdominal hysterectomy bilateral salpingo-oophorectomy  Disc surgery  Eye surgery         Social History:   Lifelong non-smoker  Denies alcohol intake         Family History:   Mother  early of coronary artery disease after first heart attack at age 53.  Hypertension.  Diabetes mellitus.          Allergies:     Allergies   Allergen Reactions     Chlorthalidone GI Disturbance     Lactose              Medications:   (Not yet reconciled by pharmacy)   aspirin 81 mg daily  Lisinopril 40 mg daily  Hydrochlorothiazide 25 mg daily  Metoprolol 12.5 mg twice daily          Review of Systems:   A comprehensive review of systems was performed and found to be negative except as described in this note           Physical Exam:   Vitals were reviewed  Temp: 99.3  F (37.4  C) Temp src: Oral BP: 108/66 Pulse: 69   Resp: 20 SpO2: 93 % O2 Device: None (Room air)    Constitutional: Awake, alert, cooperative, no apparent distress, and appears stated age.  Eyes: Lids and lashes normal, pupils equal, round and reactive to light, extra ocular muscles intact, sclera clear, conjunctiva normal.  ENT: Normocephalic, without obvious abnormality, atraumatic, tonsils without erythema or exudates, gums normal and good dentition.  Neck: Supple, symmetrical, trachea midline, no adenopathy, thyroid symmetric, not enlarged and no tenderness, skin normal.  Hematologic / Lymphatic: No cervical lymphadenopathy and no supraclavicular lymphadenopathy.  Back: Symmetric, no curvature, spinous processes are non-tender on palpation, paraspinous muscles are non-tender on palpation, no costal vertebral tenderness.  Lungs: No increased work of breathing, good air exchange, clear to auscultation bilaterally, no crackles or wheezing.  Cardiovascular: Regular rate and rhythm, normal S1 and S2, no S3 or S4, and no murmur noted.  Abdomen: Normal bowel sounds, soft, non-distended, non-tender, no masses palpated, no  hepatosplenomegaly.  Musculoskeletal: No redness, warmth, or swelling of the joints. Tone is normal.  Neurologic: Awake, alert, oriented to name, place and time.  Cranial nerves II-XII are grossly intact.    Neuropsychiatric: Normal affect, mood, orientation, memory and insight.  No markable pitting edema.  Skin: No rashes, erythema, pallor, petechia or purpura.          Data:     Results for orders placed or performed during the hospital encounter of 11/03/20 (from the past 24 hour(s))   EKG 12 lead   Result Value Ref Range    Interpretation ECG Click View Image link to view waveform and result    Comprehensive metabolic panel   Result Value Ref Range    Sodium 141 133 - 144 mmol/L    Potassium 4.0 3.4 - 5.3 mmol/L    Chloride 106 94 - 109 mmol/L    Carbon Dioxide 27 20 - 32 mmol/L    Anion Gap 8 3 - 14 mmol/L    Glucose 123 (H) 70 - 99 mg/dL    Urea Nitrogen 25 7 - 30 mg/dL    Creatinine 1.06 (H) 0.52 - 1.04 mg/dL    GFR Estimate 51 (L) >60 mL/min/[1.73_m2]    GFR Estimate If Black 59 (L) >60 mL/min/[1.73_m2]    Calcium 9.0 8.5 - 10.1 mg/dL    Bilirubin Total 0.4 0.2 - 1.3 mg/dL    Albumin 3.6 3.4 - 5.0 g/dL    Protein Total 7.4 6.8 - 8.8 g/dL    Alkaline Phosphatase 53 40 - 150 U/L    ALT 18 0 - 50 U/L    AST 18 0 - 45 U/L   Lipase   Result Value Ref Range    Lipase 119 73 - 393 U/L   CBC with platelets differential   Result Value Ref Range    WBC 9.6 4.0 - 11.0 10e9/L    RBC Count 4.21 3.8 - 5.2 10e12/L    Hemoglobin 12.1 11.7 - 15.7 g/dL    Hematocrit 37.0 35.0 - 47.0 %    MCV 88 78 - 100 fl    MCH 28.7 26.5 - 33.0 pg    MCHC 32.7 31.5 - 36.5 g/dL    RDW 12.6 10.0 - 15.0 %    Platelet Count 269 150 - 450 10e9/L    Diff Method Automated Method     % Neutrophils 85.1 %    % Lymphocytes 10.2 %    % Monocytes 3.6 %    % Eosinophils 0.5 %    % Basophils 0.4 %    % Immature Granulocytes 0.2 %    Nucleated RBCs 0 0 /100    Absolute Neutrophil 8.2 1.6 - 8.3 10e9/L    Absolute Lymphocytes 1.0 0.8 - 5.3 10e9/L     Absolute Monocytes 0.4 0.0 - 1.3 10e9/L    Absolute Eosinophils 0.1 0.0 - 0.7 10e9/L    Absolute Basophils 0.0 0.0 - 0.2 10e9/L    Abs Immature Granulocytes 0.0 0 - 0.4 10e9/L    Absolute Nucleated RBC 0.0    Troponin I   Result Value Ref Range    Troponin I ES 0.791 (HH) 0.000 - 0.045 ug/L   D dimer quantitative   Result Value Ref Range    D Dimer 0.7 (H) 0.0 - 0.50 ug/ml FEU   CRP inflammation   Result Value Ref Range    CRP Inflammation 8.7 (H) 0.0 - 8.0 mg/L   Ferritin   Result Value Ref Range    Ferritin 42 8 - 252 ng/mL   Lactate Dehydrogenase   Result Value Ref Range    Lactate Dehydrogenase 190 81 - 234 U/L   Symptomatic COVID-19 Virus (Coronavirus) by PCR    Specimen: Nasopharyngeal   Result Value Ref Range    COVID-19 Virus PCR to [facility] - Source Nasopharyngeal     COVID-19 Virus PCR to [facility] - Result       Test received-See reflex to IDDL test SARS CoV2 (COVID-19) Virus RT-PCR   Partial thromboplastin time   Result Value Ref Range    PTT 26 22 - 37 sec   XR Chest Port 1 View    Narrative    EXAM: XR CHEST PORT 1 VW  LOCATION: [facility]  DATE/TIME: 11/3/2020 2:20 AM    INDICATION: Chest pain  COMPARISON: None.      Impression    IMPRESSION: Cardiomediastinal silhouette is within normal limits. Slight elevation left hemidiaphragm. No focal consolidation or pleural effusion.   Troponin I   Result Value Ref Range    Troponin I ES 5.611 (HH) 0.000 - 0.045 ug/L      EKG results:   Performed on admission        Normal sinus rhythm, normal axis, no acute ischemic ST segment or T wave changes      All imaging studies reviewed by me.      Attestation:  I have reviewed today's vital signs, notes, medications, labs and imaging.  Total time: 35 minutes     Gilberto Fregoso MD

## 2020-11-03 NOTE — ED NOTES
DATE:  11/3/2020   TIME OF RECEIPT FROM LAB:  0716  LAB TEST:  Troponin  LAB VALUE:  5.611  RESULTS GIVEN WITH READ-BACK TO (PROVIDER):  Dr. Contreras  TIME LAB VALUE REPORTED TO PROVIDER:   0719 - Pt being treated with heparin gtt.

## 2020-11-03 NOTE — ED NOTES
Family updated regarding patient transfer and current stay in ER. Family will go home and wait to hear from patient for any updates.

## 2020-11-04 ENCOUNTER — APPOINTMENT (OUTPATIENT)
Dept: CT IMAGING | Facility: CLINIC | Age: 75
DRG: 871 | End: 2020-11-04
Attending: HOSPITALIST
Payer: MEDICARE

## 2020-11-04 LAB
ALBUMIN UR-MCNC: 70 MG/DL
AMORPH CRY #/AREA URNS HPF: ABNORMAL /HPF
ANION GAP SERPL CALCULATED.3IONS-SCNC: 7 MMOL/L (ref 3–14)
APPEARANCE UR: ABNORMAL
BILIRUB UR QL STRIP: NEGATIVE
BUN SERPL-MCNC: 15 MG/DL (ref 7–30)
CALCIUM SERPL-MCNC: 8.5 MG/DL (ref 8.5–10.1)
CHLORIDE SERPL-SCNC: 108 MMOL/L (ref 94–109)
CO2 SERPL-SCNC: 26 MMOL/L (ref 20–32)
COLOR UR AUTO: ABNORMAL
CREAT SERPL-MCNC: 0.94 MG/DL (ref 0.52–1.04)
ERYTHROCYTE [DISTWIDTH] IN BLOOD BY AUTOMATED COUNT: 12.8 % (ref 10–15)
GFR SERPL CREATININE-BSD FRML MDRD: 58 ML/MIN/{1.73_M2}
GLUCOSE SERPL-MCNC: 120 MG/DL (ref 70–99)
GLUCOSE UR STRIP-MCNC: NEGATIVE MG/DL
HCT VFR BLD AUTO: 39.3 % (ref 35–47)
HGB BLD-MCNC: 12.1 G/DL (ref 11.7–15.7)
HGB UR QL STRIP: ABNORMAL
HYALINE CASTS #/AREA URNS LPF: 13 /LPF (ref 0–2)
KETONES UR STRIP-MCNC: NEGATIVE MG/DL
LEUKOCYTE ESTERASE UR QL STRIP: ABNORMAL
MCH RBC QN AUTO: 28.1 PG (ref 26.5–33)
MCHC RBC AUTO-ENTMCNC: 30.8 G/DL (ref 31.5–36.5)
MCV RBC AUTO: 91 FL (ref 78–100)
MUCOUS THREADS #/AREA URNS LPF: PRESENT /LPF
NITRATE UR QL: NEGATIVE
PH UR STRIP: 5.5 PH (ref 5–7)
PLATELET # BLD AUTO: 222 10E9/L (ref 150–450)
POTASSIUM SERPL-SCNC: 3.4 MMOL/L (ref 3.4–5.3)
RBC # BLD AUTO: 4.3 10E12/L (ref 3.8–5.2)
RBC #/AREA URNS AUTO: >182 /HPF (ref 0–2)
SODIUM SERPL-SCNC: 141 MMOL/L (ref 133–144)
SOURCE: ABNORMAL
SP GR UR STRIP: 1.02 (ref 1–1.03)
TROPONIN I SERPL-MCNC: 7.44 UG/L (ref 0–0.04)
UFH PPP CHRO-ACNC: 0.41 IU/ML
UROBILINOGEN UR STRIP-MCNC: NORMAL MG/DL (ref 0–2)
WBC # BLD AUTO: 8.9 10E9/L (ref 4–11)
WBC #/AREA URNS AUTO: >182 /HPF (ref 0–5)
WBC CLUMPS #/AREA URNS HPF: PRESENT /HPF

## 2020-11-04 PROCEDURE — 84484 ASSAY OF TROPONIN QUANT: CPT | Performed by: INTERNAL MEDICINE

## 2020-11-04 PROCEDURE — 250N000011 HC RX IP 250 OP 636: Performed by: INTERNAL MEDICINE

## 2020-11-04 PROCEDURE — 250N000013 HC RX MED GY IP 250 OP 250 PS 637: Performed by: HOSPITALIST

## 2020-11-04 PROCEDURE — 250N000013 HC RX MED GY IP 250 OP 250 PS 637: Performed by: INTERNAL MEDICINE

## 2020-11-04 PROCEDURE — 80048 BASIC METABOLIC PNL TOTAL CA: CPT | Performed by: INTERNAL MEDICINE

## 2020-11-04 PROCEDURE — 99233 SBSQ HOSP IP/OBS HIGH 50: CPT | Performed by: HOSPITALIST

## 2020-11-04 PROCEDURE — 85520 HEPARIN ASSAY: CPT | Performed by: INTERNAL MEDICINE

## 2020-11-04 PROCEDURE — 87086 URINE CULTURE/COLONY COUNT: CPT | Performed by: INTERNAL MEDICINE

## 2020-11-04 PROCEDURE — 74176 CT ABD & PELVIS W/O CONTRAST: CPT

## 2020-11-04 PROCEDURE — 120N000001 HC R&B MED SURG/OB

## 2020-11-04 PROCEDURE — 81001 URINALYSIS AUTO W/SCOPE: CPT | Performed by: HOSPITALIST

## 2020-11-04 PROCEDURE — 87800 DETECT AGNT MULT DNA DIREC: CPT | Performed by: INTERNAL MEDICINE

## 2020-11-04 PROCEDURE — 87077 CULTURE AEROBIC IDENTIFY: CPT | Performed by: INTERNAL MEDICINE

## 2020-11-04 PROCEDURE — 87040 BLOOD CULTURE FOR BACTERIA: CPT | Performed by: INTERNAL MEDICINE

## 2020-11-04 PROCEDURE — 85027 COMPLETE CBC AUTOMATED: CPT | Performed by: INTERNAL MEDICINE

## 2020-11-04 PROCEDURE — 36415 COLL VENOUS BLD VENIPUNCTURE: CPT | Performed by: INTERNAL MEDICINE

## 2020-11-04 PROCEDURE — 258N000003 HC RX IP 258 OP 636: Performed by: HOSPITALIST

## 2020-11-04 PROCEDURE — 87186 SC STD MICRODIL/AGAR DIL: CPT | Performed by: INTERNAL MEDICINE

## 2020-11-04 PROCEDURE — 99232 SBSQ HOSP IP/OBS MODERATE 35: CPT | Performed by: INTERNAL MEDICINE

## 2020-11-04 RX ORDER — PROCHLORPERAZINE MALEATE 5 MG
5 TABLET ORAL EVERY 6 HOURS PRN
Status: DISCONTINUED | OUTPATIENT
Start: 2020-11-04 | End: 2020-11-10 | Stop reason: HOSPADM

## 2020-11-04 RX ORDER — LORAZEPAM 2 MG/ML
0.5 INJECTION INTRAMUSCULAR
Status: DISCONTINUED | OUTPATIENT
Start: 2020-11-04 | End: 2020-11-09 | Stop reason: HOSPADM

## 2020-11-04 RX ORDER — NITROGLYCERIN 5 MG/ML
VIAL (ML) INTRAVENOUS
Status: DISCONTINUED
Start: 2020-11-04 | End: 2020-11-09 | Stop reason: HOSPADM

## 2020-11-04 RX ORDER — CEFTRIAXONE 2 G/1
2 INJECTION, POWDER, FOR SOLUTION INTRAMUSCULAR; INTRAVENOUS EVERY 24 HOURS
Status: DISCONTINUED | OUTPATIENT
Start: 2020-11-04 | End: 2020-11-10

## 2020-11-04 RX ORDER — LIDOCAINE 40 MG/G
CREAM TOPICAL
Status: DISCONTINUED | OUTPATIENT
Start: 2020-11-04 | End: 2020-11-09 | Stop reason: HOSPADM

## 2020-11-04 RX ORDER — POTASSIUM CHLORIDE 1500 MG/1
20 TABLET, EXTENDED RELEASE ORAL
Status: DISCONTINUED | OUTPATIENT
Start: 2020-11-04 | End: 2020-11-09 | Stop reason: HOSPADM

## 2020-11-04 RX ORDER — CEFTRIAXONE 1 G/1
1 INJECTION, POWDER, FOR SOLUTION INTRAMUSCULAR; INTRAVENOUS ONCE
Status: COMPLETED | OUTPATIENT
Start: 2020-11-04 | End: 2020-11-04

## 2020-11-04 RX ORDER — LIDOCAINE HYDROCHLORIDE 10 MG/ML
INJECTION, SOLUTION EPIDURAL; INFILTRATION; INTRACAUDAL; PERINEURAL
Status: DISCONTINUED
Start: 2020-11-04 | End: 2020-11-04 | Stop reason: WASHOUT

## 2020-11-04 RX ORDER — LORAZEPAM 0.5 MG/1
0.5 TABLET ORAL
Status: DISCONTINUED | OUTPATIENT
Start: 2020-11-04 | End: 2020-11-09 | Stop reason: HOSPADM

## 2020-11-04 RX ORDER — HEPARIN SODIUM 1000 [USP'U]/ML
INJECTION, SOLUTION INTRAVENOUS; SUBCUTANEOUS
Status: DISCONTINUED
Start: 2020-11-04 | End: 2020-11-04 | Stop reason: WASHOUT

## 2020-11-04 RX ORDER — VERAPAMIL HYDROCHLORIDE 2.5 MG/ML
INJECTION, SOLUTION INTRAVENOUS
Status: DISCONTINUED
Start: 2020-11-04 | End: 2020-11-04 | Stop reason: WASHOUT

## 2020-11-04 RX ORDER — SODIUM CHLORIDE 9 MG/ML
INJECTION, SOLUTION INTRAVENOUS CONTINUOUS
Status: DISCONTINUED | OUTPATIENT
Start: 2020-11-04 | End: 2020-11-05

## 2020-11-04 RX ORDER — SODIUM CHLORIDE, SODIUM LACTATE, POTASSIUM CHLORIDE, CALCIUM CHLORIDE 600; 310; 30; 20 MG/100ML; MG/100ML; MG/100ML; MG/100ML
INJECTION, SOLUTION INTRAVENOUS CONTINUOUS
Status: DISCONTINUED | OUTPATIENT
Start: 2020-11-04 | End: 2020-11-05

## 2020-11-04 RX ORDER — PROCHLORPERAZINE 25 MG
12.5 SUPPOSITORY, RECTAL RECTAL EVERY 12 HOURS PRN
Status: DISCONTINUED | OUTPATIENT
Start: 2020-11-04 | End: 2020-11-10 | Stop reason: HOSPADM

## 2020-11-04 RX ADMIN — Medication 1 MG: at 18:57

## 2020-11-04 RX ADMIN — ASPIRIN 325 MG: 325 TABLET, COATED ORAL at 07:56

## 2020-11-04 RX ADMIN — ACETAMINOPHEN 650 MG: 160 LIQUID ORAL at 02:48

## 2020-11-04 RX ADMIN — SODIUM CHLORIDE, POTASSIUM CHLORIDE, SODIUM LACTATE AND CALCIUM CHLORIDE: 600; 310; 30; 20 INJECTION, SOLUTION INTRAVENOUS at 20:18

## 2020-11-04 RX ADMIN — CEFTRIAXONE 2 G: 2 INJECTION, POWDER, FOR SOLUTION INTRAMUSCULAR; INTRAVENOUS at 16:04

## 2020-11-04 RX ADMIN — ACETAMINOPHEN 650 MG: 160 LIQUID ORAL at 18:56

## 2020-11-04 RX ADMIN — POLYETHYLENE GLYCOL 3350 17 G: 17 POWDER, FOR SOLUTION ORAL at 07:54

## 2020-11-04 RX ADMIN — ROSUVASTATIN CALCIUM 40 MG: 20 TABLET, FILM COATED ORAL at 07:55

## 2020-11-04 RX ADMIN — ACETAMINOPHEN 650 MG: 160 LIQUID ORAL at 07:54

## 2020-11-04 RX ADMIN — SODIUM CHLORIDE, POTASSIUM CHLORIDE, SODIUM LACTATE AND CALCIUM CHLORIDE 1000 ML: 600; 310; 30; 20 INJECTION, SOLUTION INTRAVENOUS at 10:54

## 2020-11-04 RX ADMIN — CEFTRIAXONE 1 G: 1 INJECTION, POWDER, FOR SOLUTION INTRAMUSCULAR; INTRAVENOUS at 03:57

## 2020-11-04 RX ADMIN — METOPROLOL TARTRATE 25 MG: 25 TABLET, FILM COATED ORAL at 07:55

## 2020-11-04 RX ADMIN — SODIUM CHLORIDE, POTASSIUM CHLORIDE, SODIUM LACTATE AND CALCIUM CHLORIDE: 600; 310; 30; 20 INJECTION, SOLUTION INTRAVENOUS at 10:35

## 2020-11-04 RX ADMIN — HEPARIN SODIUM 800 UNITS/HR: 10000 INJECTION, SOLUTION INTRAVENOUS at 00:22

## 2020-11-04 RX ADMIN — ONDANSETRON 4 MG: 2 INJECTION INTRAMUSCULAR; INTRAVENOUS at 18:56

## 2020-11-04 RX ADMIN — PROCHLORPERAZINE EDISYLATE 5 MG: 5 INJECTION INTRAMUSCULAR; INTRAVENOUS at 20:11

## 2020-11-04 ASSESSMENT — ACTIVITIES OF DAILY LIVING (ADL)
ADLS_ACUITY_SCORE: 16

## 2020-11-04 NOTE — PLAN OF CARE
Code status: FULL   COVID-19 result: Negative     Pertinent assessment: A&Ox4. VSS. Tele: SR; 5 beat run Vtach @ 0649. EF: 45-50%. Denies pain. LS: clear, equal bilaterally. GI/: WDL. Skin: pale; intact. Hep gtt infusing @ 8mL/hr. Up SBA w/ GB. NPO started after midnight. Trops: 8.358, 7.444    Treatment plan: Continue hep gtt. Angio today. Cardiology following. See notes. Continue with POC.   Discharge dispo:  TBD

## 2020-11-04 NOTE — PROGRESS NOTES
Cross Cx:    Paged for blood culture positive GNR. Currently on ceftriaxone for possible UTI. Change dose of ceftriaxone to 2 gm. Repeat cultures in AM.  Addendum: Grew E. Coli. Continue with care plan.

## 2020-11-04 NOTE — PROGRESS NOTES
"[facility]    Cardiology Progress Note    Assessment & Plan   Hue Montanez is a 75 year old female who was admitted on 11/3/2020.     1.  Gram negative sepsis : etiology suspected to be urinary tract. CT pending to look for stones  2.  NSTEMI : peak troponin 8.5 TTE anterior regional wall motion abnormality. EF 50%.Old history of MI 2014 with no significant coronary narrowing on coronary angiogram  3.  Dyslipidemia  4. HTN    Assessment : She may have \" stress/Takotsubo\" cardiomyopathy, but cannot exclude CAD. Fortunately she is free of chest pain or heart failure. We will need to delay invasive procedures until sepsis clears. In the meantime, continue standard treatment with asa heparin bb as blood pressure permits.    Recommendations  1) cancel heart catheterization for today  2) continue asa heparin metoprolol  3)  Evaluation for source of GNR sepsis, antibiotics  4) will wait until sepsis clears for coronary angiography/ possible intervention    Yaniv Olvera MD    Interval History   No chest pain. Blood cultures returned positive for gram negative rods. Antibiotics begun. CT pending    Physical Exam   Temp: 97.8  F (36.6  C) Temp src: Oral BP: 121/70 Pulse: 65   Resp: 16 SpO2: 96 % O2 Device: None (Room air)    Vitals:    11/03/20 0040 11/03/20 1354   Weight: 83 kg (183 lb) 79.5 kg (175 lb 3.2 oz)     Vital Signs with Ranges  Temp:  [97.8  F (36.6  C)-100.2  F (37.9  C)] 97.8  F (36.6  C)  Pulse:  [63-91] 65  Resp:  [11-21] 16  BP: (100-174)/(61-91) 121/70  SpO2:  [92 %-100 %] 96 %  I/O last 3 completed shifts:  In: 0   Out: 450 [Urine:450]    Constitutional: Awake, alert, cooperative, fatigued.  Lungs: Clear to auscultation bilaterally, no crackles or wheezing  Cardiovascular: Regular rate and rhythm, normal S1 and S2, and no murmur noted  Abdomen: Normal bowel sounds, soft, non-distended, non-tender  Skin: No rashes, no cyanosis, no edema  Other:       Medications     " heparin 800 Units/hr (11/04/20 0800)     lactated ringers       - MEDICATION INSTRUCTIONS -       - MEDICATION INSTRUCTIONS -       sodium chloride         cefTRIAXone  2 g Intravenous Q24H     heparin (porcine)         lidocaine (PF)         metoprolol tartrate  25 mg Oral BID     nitroGLYcerin in D5W         polyethylene glycol  17 g Oral Daily     rosuvastatin  40 mg Oral Daily     senna-docusate  1 tablet Oral BID    Or     senna-docusate  2 tablet Oral BID     sodium chloride (PF)  3 mL Intracatheter Q8H     sodium chloride (PF)  3 mL Intracatheter Q8H     verapamil           Data   I personally reviewed labs.   Recent Labs   Lab 11/04/20  0642 11/04/20  0149 11/03/20  2242 11/03/20  1840 11/03/20  1457 11/03/20  0045 11/03/20  0045   WBC 8.9  --   --   --  8.0  --  9.6   HGB 12.1  --   --   --  13.6  --  12.1   MCV 91  --   --   --  90  --  88     --   --   --  251  --  269     --   --   --   --   --  141   POTASSIUM 3.4  --   --   --   --   --  4.0   CHLORIDE 108  --   --   --   --   --  106   CO2 26  --   --   --   --   --  27   BUN 15  --   --   --   --   --  25   CR 0.94  --   --   --   --   --  1.06*   ANIONGAP 7  --   --   --   --   --  8   TROPI  --  7.444* 8.358* 8.544*  --    < > 0.791*    < > = values in this interval not displayed.     No results found for this or any previous visit (from the past 24 hour(s)).

## 2020-11-04 NOTE — PROGRESS NOTES
[facility]    Hospitalist Progress Note      Assessment & Plan   75-year-old female with known CAD, hypertension, hyperlipidemia who presents with shaking chills, nausea.  She was found to have an NSTEMI and Ecoli bacteremia likely from urinary source.    #Severe sepsis secondary to Ecoli bacteremia secondary to likely urinary source: Patient did have a low-grade fever to 100.2 when she hit the floor on 11/3.  She was nontachycardic, nontachypneic.  No leukocytosis.  She did have evidence of an NSTEMI secondary to her infection.  -Blood culture drawn on admission was positive for E coli.  UA is indicative of infection.  Urine culture is in process (urine was collected after 1st dose of ceftriaxone administered). She was started on ceftriaxone therapy.   -CT abdomen pelvis today to rule out stone given she does have a history of nephrolithiasis in the past.  Lower likelihood given no CV tenderness and no pain.  -If CT abdomen pelvis is negative okay to eat.  -Continue ceftriaxone 2 g daily  -Follow repeat blood cultures to ensure clearance.    #NSTEMI: Troponin did peak at 8.5.  Patient does have a history of CAD.  TTE did show EF of 50% with anterior regional wall abnormality.  Cardiology has suspicion for stress cardiomyopathy but cannot exclude CAD.  -Heparin gtt, aspirin, statin  -BP soft today.  Discontinue lisinopril and holding metoprolol for now.  -Neurology consulted.    Chronic Med Conditions  #HTN: Holding lisinopril and metop for now given soft BP's this AM in setting of sepsis    DVT Prophylaxis: Hep gtt  Code Status: Full Code  Expected discharge: Likely 2-4 more days.      Helder Farooq MD  Text Page    Interval History   Patient feels better this a.m.  Blood culture drawn on admission did grow Klebsiella.  UA is positive for infection.  She states fatigue is much better.  No chest pain or shortness of breath.  No subjective fevers or chills.  Endorses hunger this morning.    -Data  reviewed today: I reviewed all new labs and imaging results over the last 24 hours.     Physical Exam   Temp: 97.9  F (36.6  C) Temp src: Oral BP: 99/62 Pulse: 53   Resp: 16 SpO2: 94 % O2 Device: None (Room air)    Vitals:    11/03/20 0040 11/03/20 1354   Weight: 83 kg (183 lb) 79.5 kg (175 lb 3.2 oz)     Vital Signs with Ranges  Temp:  [97.8  F (36.6  C)-100.2  F (37.9  C)] 97.9  F (36.6  C)  Pulse:  [53-91] 53  Resp:  [11-18] 16  BP: ()/(61-83) 99/62  SpO2:  [92 %-99 %] 94 %  I/O last 3 completed shifts:  In: 0   Out: 450 [Urine:450]    Constitutional: Nontoxic, NAD. Appears clinically improved/comfortable  HEENT: Normocephalic. MMM, No elevation of JVD noted.   Respiratory: Nl WOB, Clear bilaterally, No wheezes or crackles  Cardiovascular: Regular, no murmur  GI: BS+, NT, ND. No CV tenderness  Skin/Integumen: WWP, no rash. No edema  Neuro: CNII-XII intact. Moves all extremities. No tremor. A&Ox3.    Medications     heparin 800 Units/hr (11/04/20 0800)     lactated ringers 125 mL/hr at 11/04/20 1035     - MEDICATION INSTRUCTIONS -       - MEDICATION INSTRUCTIONS -       sodium chloride         cefTRIAXone  2 g Intravenous Q24H     heparin (porcine)         lactated ringers  1,000 mL Intravenous Once     lidocaine (PF)         nitroGLYcerin in D5W         polyethylene glycol  17 g Oral Daily     rosuvastatin  40 mg Oral Daily     senna-docusate  1 tablet Oral BID    Or     senna-docusate  2 tablet Oral BID     sodium chloride (PF)  3 mL Intracatheter Q8H     sodium chloride (PF)  3 mL Intracatheter Q8H     verapamil           Data   Recent Labs   Lab 11/04/20  0642 11/04/20  0149 11/03/20  2242 11/03/20  1840 11/03/20  1457 11/03/20  0045 11/03/20  0045   WBC 8.9  --   --   --  8.0  --  9.6   HGB 12.1  --   --   --  13.6  --  12.1   MCV 91  --   --   --  90  --  88     --   --   --  251  --  269     --   --   --   --   --  141   POTASSIUM 3.4  --   --   --   --   --  4.0   CHLORIDE 108  --   --    --   --   --  106   CO2 26  --   --   --   --   --  27   BUN 15  --   --   --   --   --  25   CR 0.94  --   --   --   --   --  1.06*   ANIONGAP 7  --   --   --   --   --  8   RIN 8.5  --   --   --   --   --  9.0   *  --   --   --   --   --  123*   ALBUMIN  --   --   --   --   --   --  3.6   PROTTOTAL  --   --   --   --   --   --  7.4   BILITOTAL  --   --   --   --   --   --  0.4   ALKPHOS  --   --   --   --   --   --  53   ALT  --   --   --   --   --   --  18   AST  --   --   --   --   --   --  18   LIPASE  --   --   --   --   --   --  119   TROPI  --  7.444* 8.358* 8.544*  --    < > 0.791*    < > = values in this interval not displayed.       No results found for this or any previous visit (from the past 24 hour(s)).

## 2020-11-04 NOTE — PROGRESS NOTES
SPIRITUAL HEALTH SERVICES Progress Note  [facility]    Spiritual Health Services consult order for assistance with healthcare directive.  Provided informational resources and education related to directive.  Pt would like to review documents with healthcare agent (spouse, Дмитрий) and will request follow up as needed.    Plan: [facility] remains available for any additional questions or concerns.    Kevin Hitchcock MA  Staff   Pager: [phone]  Phone: [phone]

## 2020-11-04 NOTE — PROVIDER NOTIFICATION
"MD notified @ 0243: \"FYI Blood cx from [facility]UE showing Gram negative rods\"     MD notified @ 6562: \"FYI cultures from [facility] growing E.coli\"  "

## 2020-11-04 NOTE — PLAN OF CARE
A&Ox4. Tele SR/SB. Metoprolol discontinued after morning meds r/t dizziness, HR 50's, SBP 89/61, Improved 101/62 after 1L LR Bolus. Lungs clear, RA. Denies SOB. Lower back pain controlled with tylenol and repositioning. Afebrile. Cath cancelled for today. Diet Ordered. Cont. Of bowel and bladder. Hep gtt continues. POC reviewed with pt and family.

## 2020-11-05 LAB
ANION GAP SERPL CALCULATED.3IONS-SCNC: 10 MMOL/L (ref 3–14)
BACTERIA SPEC CULT: NORMAL
BUN SERPL-MCNC: 13 MG/DL (ref 7–30)
CALCIUM SERPL-MCNC: 8.1 MG/DL (ref 8.5–10.1)
CHLORIDE SERPL-SCNC: 110 MMOL/L (ref 94–109)
CO2 SERPL-SCNC: 23 MMOL/L (ref 20–32)
CREAT SERPL-MCNC: 0.79 MG/DL (ref 0.52–1.04)
ERYTHROCYTE [DISTWIDTH] IN BLOOD BY AUTOMATED COUNT: 13 % (ref 10–15)
GFR SERPL CREATININE-BSD FRML MDRD: 73 ML/MIN/{1.73_M2}
GLUCOSE SERPL-MCNC: 108 MG/DL (ref 70–99)
HCT VFR BLD AUTO: 34.2 % (ref 35–47)
HGB BLD-MCNC: 10.6 G/DL (ref 11.7–15.7)
Lab: NORMAL
MAGNESIUM SERPL-MCNC: 2 MG/DL (ref 1.6–2.3)
MCH RBC QN AUTO: 28.1 PG (ref 26.5–33)
MCHC RBC AUTO-ENTMCNC: 31 G/DL (ref 31.5–36.5)
MCV RBC AUTO: 91 FL (ref 78–100)
PLATELET # BLD AUTO: 204 10E9/L (ref 150–450)
POTASSIUM SERPL-SCNC: 3.2 MMOL/L (ref 3.4–5.3)
RBC # BLD AUTO: 3.77 10E12/L (ref 3.8–5.2)
SODIUM SERPL-SCNC: 143 MMOL/L (ref 133–144)
SPECIMEN SOURCE: NORMAL
UFH PPP CHRO-ACNC: 0.28 IU/ML
WBC # BLD AUTO: 6.1 10E9/L (ref 4–11)

## 2020-11-05 PROCEDURE — 250N000011 HC RX IP 250 OP 636: Performed by: HOSPITALIST

## 2020-11-05 PROCEDURE — 250N000013 HC RX MED GY IP 250 OP 250 PS 637: Performed by: INTERNAL MEDICINE

## 2020-11-05 PROCEDURE — C9113 INJ PANTOPRAZOLE SODIUM, VIA: HCPCS | Performed by: HOSPITALIST

## 2020-11-05 PROCEDURE — 85520 HEPARIN ASSAY: CPT | Performed by: HOSPITALIST

## 2020-11-05 PROCEDURE — 250N000011 HC RX IP 250 OP 636: Performed by: INTERNAL MEDICINE

## 2020-11-05 PROCEDURE — 120N000001 HC R&B MED SURG/OB

## 2020-11-05 PROCEDURE — 85027 COMPLETE CBC AUTOMATED: CPT | Performed by: HOSPITALIST

## 2020-11-05 PROCEDURE — 99231 SBSQ HOSP IP/OBS SF/LOW 25: CPT | Performed by: INTERNAL MEDICINE

## 2020-11-05 PROCEDURE — 250N000013 HC RX MED GY IP 250 OP 250 PS 637: Performed by: HOSPITALIST

## 2020-11-05 PROCEDURE — 83735 ASSAY OF MAGNESIUM: CPT | Performed by: HOSPITALIST

## 2020-11-05 PROCEDURE — 258N000003 HC RX IP 258 OP 636: Performed by: HOSPITALIST

## 2020-11-05 PROCEDURE — 36415 COLL VENOUS BLD VENIPUNCTURE: CPT | Performed by: HOSPITALIST

## 2020-11-05 PROCEDURE — 99233 SBSQ HOSP IP/OBS HIGH 50: CPT | Performed by: HOSPITALIST

## 2020-11-05 PROCEDURE — 80048 BASIC METABOLIC PNL TOTAL CA: CPT | Performed by: HOSPITALIST

## 2020-11-05 RX ORDER — CARVEDILOL 12.5 MG/1
12.5 TABLET ORAL 2 TIMES DAILY WITH MEALS
Status: DISCONTINUED | OUTPATIENT
Start: 2020-11-05 | End: 2020-11-10 | Stop reason: HOSPADM

## 2020-11-05 RX ORDER — LISINOPRIL 20 MG/1
20 TABLET ORAL DAILY
Status: DISCONTINUED | OUTPATIENT
Start: 2020-11-05 | End: 2020-11-10 | Stop reason: HOSPADM

## 2020-11-05 RX ORDER — ASPIRIN 81 MG/1
81 TABLET, CHEWABLE ORAL DAILY
Status: DISCONTINUED | OUTPATIENT
Start: 2020-11-05 | End: 2020-11-10 | Stop reason: HOSPADM

## 2020-11-05 RX ADMIN — ACETAMINOPHEN 650 MG: 160 LIQUID ORAL at 05:37

## 2020-11-05 RX ADMIN — ACETAMINOPHEN 650 MG: 160 LIQUID ORAL at 15:34

## 2020-11-05 RX ADMIN — ASPIRIN 81 MG: 81 TABLET, CHEWABLE ORAL at 17:24

## 2020-11-05 RX ADMIN — DOCUSATE SODIUM 50 MG AND SENNOSIDES 8.6 MG 1 TABLET: 8.6; 5 TABLET, FILM COATED ORAL at 22:06

## 2020-11-05 RX ADMIN — HEPARIN SODIUM 800 UNITS/HR: 10000 INJECTION, SOLUTION INTRAVENOUS at 08:58

## 2020-11-05 RX ADMIN — CEFTRIAXONE 2 G: 2 INJECTION, POWDER, FOR SOLUTION INTRAMUSCULAR; INTRAVENOUS at 15:34

## 2020-11-05 RX ADMIN — SODIUM CHLORIDE, POTASSIUM CHLORIDE, SODIUM LACTATE AND CALCIUM CHLORIDE: 600; 310; 30; 20 INJECTION, SOLUTION INTRAVENOUS at 04:14

## 2020-11-05 RX ADMIN — CARVEDILOL 12.5 MG: 12.5 TABLET, FILM COATED ORAL at 17:24

## 2020-11-05 RX ADMIN — PANTOPRAZOLE SODIUM 40 MG: 40 INJECTION, POWDER, FOR SOLUTION INTRAVENOUS at 22:06

## 2020-11-05 RX ADMIN — POLYETHYLENE GLYCOL 3350 17 G: 17 POWDER, FOR SOLUTION ORAL at 08:53

## 2020-11-05 RX ADMIN — ROSUVASTATIN CALCIUM 40 MG: 20 TABLET, FILM COATED ORAL at 08:53

## 2020-11-05 RX ADMIN — LISINOPRIL 20 MG: 20 TABLET ORAL at 17:24

## 2020-11-05 ASSESSMENT — ACTIVITIES OF DAILY LIVING (ADL)
ADLS_ACUITY_SCORE: 16
ADLS_ACUITY_SCORE: 17
ADLS_ACUITY_SCORE: 16
ADLS_ACUITY_SCORE: 16
ADLS_ACUITY_SCORE: 17
ADLS_ACUITY_SCORE: 17

## 2020-11-05 NOTE — PROGRESS NOTES
"[facility]    Cardiology Progress Note    Assessment & Plan   Hue Montanez is a 75 year old female who was admitted on 11/3/2020.     1. E.Coli urosepsis  2.  NSTEMI; Troponin 8.5  Mid anterior wall RWMA. Suspected \"Takotsubo\" awaiting angiography  3.  HTN  4. Dyslipidemia  5. Old history of NSTEMI with normal coronaries by report 2014    Assessment : Responding to ceftriaxone. Likely can resume blood pressure meds.    Plan  1) Diagnostic coronary angiography when IM feels sepsis cleared.   2) If she remains stable, would consider stopping heparin and using aspirin  3) resume blood pressure meds    Yaniv Olvera MD    Interval History   Transient dip in blood pressure yesterday associated with chills, now resolved. Her blood pressure meds had been held.    Physical Exam   Temp: 98.2  F (36.8  C) Temp src: Oral BP: (!) 151/74 Pulse: 74   Resp: 16 SpO2: 99 % O2 Device: None (Room air)    Vitals:    11/03/20 0040 11/03/20 1354   Weight: 83 kg (183 lb) 79.5 kg (175 lb 3.2 oz)     Vital Signs with Ranges  Temp:  [97.8  F (36.6  C)-99.2  F (37.3  C)] 98.2  F (36.8  C)  Pulse:  [64-74] 74  Resp:  [16] 16  BP: (111-151)/(55-74) 151/74  SpO2:  [93 %-99 %] 99 %  I/O last 3 completed shifts:  In: 2226.08 [P.O.:480; I.V.:1746.08]  Out: 550 [Urine:550]    Constitutional: Awake, alert, cooperative, no apparent distress  Lungs: Clear to auscultation bilaterally, no crackles or wheezing  Cardiovascular: Regular rate and rhythm, normal S1 and S2, and no murmur noted  Abdomen: Normal bowel sounds, soft, non-distended, non-tender  Skin: No rashes, no cyanosis, no edema  Other:       Medications     heparin 800 Units/hr (11/05/20 4658)     - MEDICATION INSTRUCTIONS -       - MEDICATION INSTRUCTIONS -         cefTRIAXone  2 g Intravenous Q24H     nitroGLYcerin in D5W         polyethylene glycol  17 g Oral Daily     rosuvastatin  40 mg Oral Daily     senna-docusate  1 tablet Oral BID    Or     " senna-docusate  2 tablet Oral BID     sodium chloride (PF)  3 mL Intracatheter Q8H     sodium chloride (PF)  3 mL Intracatheter Q8H       Data   I personally reviewed labs.  Recent Labs   Lab 11/05/20  0656 11/04/20  0642 11/04/20  0149 11/03/20  2242 11/03/20  1840 11/03/20  1457 11/03/20  0045 11/03/20  0045   WBC 6.1 8.9  --   --   --  8.0  --  9.6   HGB 10.6* 12.1  --   --   --  13.6  --  12.1   MCV 91 91  --   --   --  90  --  88    222  --   --   --  251  --  269    141  --   --   --   --   --  141   POTASSIUM 3.2* 3.4  --   --   --   --   --  4.0   CHLORIDE 110* 108  --   --   --   --   --  106   CO2 23 26  --   --   --   --   --  27   BUN 13 15  --   --   --   --   --  25   CR 0.79 0.94  --   --   --   --   --  1.06*   ANIONGAP 10 7  --   --   --   --   --  8   TROPI  --   --  7.444* 8.358* 8.544*  --    < > 0.791*    < > = values in this interval not displayed.     No results found for this or any previous visit (from the past 24 hour(s)).

## 2020-11-05 NOTE — PROVIDER NOTIFICATION
"1951: \"Pt still complaining of nausea after PRN IV zofran at 1856. Can you order something else for nausea? Thanks! \" MD notified.   "

## 2020-11-05 NOTE — PROGRESS NOTES
SPIRITUAL HEALTH SERVICES Progress Note  [facility]    Spiritual Health Services consult order follow up for assistance with healthcare directive.  Pt was not well enough to discuss the directive yesterday.  She states she is much better today but has not looked at materials yet.  She declined further assistance at this time.      Plan: [facility] Services remains available for additional emotional/spiritual support.    Kevin Hitchcock MA  Staff   Pager: [phone]  Phone: [phone]

## 2020-11-05 NOTE — PROGRESS NOTES
[facility]    Hospitalist Progress Note  Name: Hue Montanez    MRN: [id number]  Provider:  Reggie Layne DO, MPH  Date of Service: 11/05/2020    Summary of Stay: 75-year-old female with known CAD, hypertension, hyperlipidemia who presents with shaking chills, nausea.  She was found to have an NSTEMI and Ecoli bacteremia likely from urinary source.     #Severe sepsis secondary to Ecoli bacteremia secondary to likely urinary source: Patient did have a low-grade fever to 100.2 when she hit the floor on 11/3.  She was nontachycardic, nontachypneic.  No leukocytosis.  She did have evidence of an NSTEMI secondary to her infection.  -Blood culture drawn on admission was positive for E coli.  UA is indicative of infection.  Urine culture NGTD (urine was collected after 1st dose of ceftriaxone administered). She was started on ceftriaxone therapy.    -CT abdomen pelvis shows no obstruction, but likely right perinephric and periureteric infiltration may be related to recent obstruction or pyelonephritis.   -Continue ceftriaxone 2 g daily  -Follow repeat blood cultures to ensure clearance.  -Stop IVF.     #NSTEMI: Troponin did peak at 8.5.  Patient does have a history of CAD.  TTE did show EF of 50% with anterior regional wall abnormality.  Cardiology has suspicion for stress cardiomyopathy but cannot exclude CAD.  -Heparin gtt, aspirin, statin.  -BP soft today.  Holding lisinopril and metoprolol for now.  -Cardiology consulted.     #Incidental pancreatic lesion: Non emergent follow-up MRI as outpatient.    Chronic Med Conditions  #HTN: Holding lisinopril and metoprolol for now given soft BP's this AM in setting of sepsis.    DVT Prophylaxis: Heparin SQ  Code Status: Full Code  Diet: Low Saturated Fat Na <2400 mg    Rodriguez Catheter: not present  Disposition: Expected discharge in 2-3 days to home. Goals prior to discharge include manage infection and cardiac work up.   Incidental Findings: As  above.  Family updated today: No.    Add: Called with GPC clusters from repeat BCx, suspect contaminant given difference organism. No vancomycin, follow-up final result.     Interval History   Assumed care from previous hospitalist. The history was fully reviewed.  The patient reports doing well. No chest pain or shortness of breath. No nausea, vomiting, diarrhea, constipation. No fevers. No other specific complaints identified.     -Data reviewed today: I personally reviewed all new labs and imaging results over the last 24 hours.     Physical Exam   Temp: 98.2  F (36.8  C) Temp src: Oral BP: 113/55 Pulse: 64   Resp: 16 SpO2: 96 % O2 Device: None (Room air)    Vitals:    11/03/20 0040 11/03/20 1354   Weight: 83 kg (183 lb) 79.5 kg (175 lb 3.2 oz)     Vital Signs with Ranges  Temp:  [97.7  F (36.5  C)-99.2  F (37.3  C)] 98.2  F (36.8  C)  Pulse:  [] 64  Resp:  [16] 16  BP: ()/(55-73) 113/55  SpO2:  [93 %-97 %] 96 %  I/O last 3 completed shifts:  In: 2357.38 [P.O.:360; I.V.:1997.38]  Out: 550 [Urine:550]    GENERAL: No apparent distress. Awake, alert, and fully oriented.  HEENT: Normocephalic, atraumatic. Extraocular movements intact.  CARDIOVASCULAR: Regular rate and rhythm without murmurs or rubs. No S3.  PULMONARY: Clear bilaterally.  GASTROINTESTINAL: Soft, non-tender, non-distended. Bowel sounds normoactive.   EXTREMITIES: No cyanosis or clubbing. No edema.  NEUROLOGICAL: CN 2-12 grossly intact, no focal neurological deficits.  DERMATOLOGICAL: No rash, ulcer, bruising, nor jaundice.     Medications     heparin 800 Units/hr (11/05/20 0858)     - MEDICATION INSTRUCTIONS -       - MEDICATION INSTRUCTIONS -         cefTRIAXone  2 g Intravenous Q24H     nitroGLYcerin in D5W         polyethylene glycol  17 g Oral Daily     rosuvastatin  40 mg Oral Daily     senna-docusate  1 tablet Oral BID    Or     senna-docusate  2 tablet Oral BID     sodium chloride (PF)  3 mL Intracatheter Q8H     sodium chloride (PF)   3 mL Intracatheter Q8H     Data     Laboratory:  Recent Labs   Lab 11/05/20  0656 11/04/20  0642 11/03/20  1457   WBC 6.1 8.9 8.0   HGB 10.6* 12.1 13.6   HCT 34.2* 39.3 43.1   MCV 91 91 90    222 251     Recent Labs   Lab 11/05/20  0656 11/04/20  0642 11/03/20  0045    141 141   POTASSIUM 3.2* 3.4 4.0   CHLORIDE 110* 108 106   CO2 23 26 27   ANIONGAP 10 7 8   * 120* 123*   BUN 13 15 25   CR 0.79 0.94 1.06*   GFRESTIMATED 73 58* 51*   GFRESTBLACK 84 68 59*   RIN 8.1* 8.5 9.0     Recent Labs   Lab 11/04/20  0642 11/04/20  0634 11/04/20  0250 11/03/20  1329   CULT No growth after 20 hours No growth after 20 hours <10,000 colonies/mL  mixed urogenital linda  Susceptibility testing not routinely done   Cultured on the 1st day of incubation:  Escherichia coli  *  Critical Value/Significant Value, preliminary result only, called to and read back by  Tara Watt RN at 0123 on 11.4.20 by .    (Note)  POSITIVE for E.COLI by Verigene multiplex nucleic acid test. Final  identification and antimicrobial susceptibility testing will be  verified by standard methods. Verigene test will not distinguish  E.coli from Shigella species including S.dysenteriae, S.flexneri,  S.boydii, and S.sonnei. Specimens containing Shigella species or  E.coli will be reported as Positive for E.coli.    Specimen tested with Verigene multiplex, gram-negative blood culture  nucleic acid test for the following targets: Acinetobacter sp.,  Citrobacter sp., Enterobacter sp., Proteus sp., E. coli, K.  pneumoniae/oxytoca, P. aeruginosa, and the following resistance  markers: CTXM, KPC, NDM, VIM, IMP and OXA.    Critical Value/Significant Value called to and read back by  Ernestina Feliciano RN at 0342 11.4.20. amd         Imaging:  Recent Results (from the past 24 hour(s))   CT Abdomen Pelvis w/o Contrast    Narrative    CT ABDOMEN AND PELVIS WITHOUT CONTRAST 11/4/2020 10:14 AM    CLINICAL HISTORY: Urinary tract stone, known,  symptomatic,  complications or risk factors. Patient with klebsiella bacteremia,  +UA. History of kidney stones.    TECHNIQUE: CT scan of the abdomen and pelvis was performed without IV  contrast. Multiplanar reformats were obtained. Dose reduction  techniques were used.  CONTRAST: None.    COMPARISON: None.    FINDINGS:   LOWER CHEST: Normal.    HEPATOBILIARY: The gallbladder is absent. No evidence of worrisome  liver lesion.    PANCREAS: There is a lobular low-density lesion in the body of the  pancreas that measures 2.8 x 2.3 cm (series 4, image 55) and is  associated with a large calcification. Distal to this, the pancreas is  atrophic.    SPLEEN: Normal.    ADRENAL GLANDS: Normal.    KIDNEYS/BLADDER: No hydronephrosis or hydroureter. No urinary tract  calculi are demonstrated. There is perinephric infiltration on the  right, mild. No worrisome renal lesions demonstrated.    BOWEL: Mild diverticulosis without evidence of diverticulitis. No  bowel obstruction or free air.    LYMPH NODES: Normal.    VASCULATURE: Nonaneurysmal aortic atherosclerosis.    PELVIC ORGANS: Hysterectomy.    OTHER: None.    MUSCULOSKELETAL: Unremarkable.      Impression    IMPRESSION:   1.  Right perinephric and periureteric infiltration may be related to  recent obstruction or pyelonephritis.  2.  Lobular low-density lesion in the distal body of the pancreas  associated with a large calcification could represent a pseudocyst, a  cyst, or a cystic neoplasm. Nonemergent follow up pancreatic MRI  recommended.    MD Reggie TINEO DO MPH  [facility] Hospitalist  [address] [place] [address]  Pager: [phone]  11/05/2020

## 2020-11-05 NOTE — PROVIDER NOTIFICATION
11/05/20 1337   Significant Event   Significant Event Critical result/value;Other (see comments)  (positive cultures)     Just received a call from Jenny SNOWDEN, positive blood cultures drawn on 11/4 at 0634 in the R arm show gram+ cocci in clusters and clumps. MD notified at [id number]    Sagar Rosario RN on 11/5/2020 at 1:38 PM

## 2020-11-05 NOTE — PLAN OF CARE
"A&O. Tele SR with prolonged QT. LS clear. C/ nausea, PRN compazine given. C/o back pain, PRN tylenol given. Hep gtt infusing at 800 units/hr, LR infusing at 125 mL/hr. Up with SBA with gait belt. Cardiology following.     /62 (BP Location: Right arm)   Pulse 74   Temp 98  F (36.7  C) (Oral)   Resp 16   Ht 1.727 m (5' 8\")   Wt 79.5 kg (175 lb 3.2 oz)   SpO2 93%   BMI 26.64 kg/m     "

## 2020-11-05 NOTE — PROVIDER NOTIFICATION
11/05/20 1631   Significant Event   Significant Event Critical result/value  (+ staph species from BC on 11/4 at 0634 in R arm)     MD notified    Sagar Rosario RN on 11/5/2020 at 4:33 PM

## 2020-11-05 NOTE — PLAN OF CARE
A&Ox4. VSS. Tele: SR. SBA w/ GB. LS clear. IV LR stopped. Heparin gtt 8ml/hr. Per RN managed heparin orders, no change to rate, recheck hep 10a in AM. LS clear, denies SOB. Appetite good. Tylenol for mild back pain. Cardiology following. Will continue with POC. discharge plan tbd    Sagar Rosario, RN on 11/5/2020 at 2:20 PM

## 2020-11-06 LAB
BACTERIA SPEC CULT: ABNORMAL
ERYTHROCYTE [DISTWIDTH] IN BLOOD BY AUTOMATED COUNT: 13.1 % (ref 10–15)
HCT VFR BLD AUTO: 29.8 % (ref 35–47)
HGB BLD-MCNC: 9.4 G/DL (ref 11.7–15.7)
MCH RBC QN AUTO: 28.4 PG (ref 26.5–33)
MCHC RBC AUTO-ENTMCNC: 31.5 G/DL (ref 31.5–36.5)
MCV RBC AUTO: 90 FL (ref 78–100)
PLATELET # BLD AUTO: 213 10E9/L (ref 150–450)
POTASSIUM SERPL-SCNC: 3.5 MMOL/L (ref 3.4–5.3)
RBC # BLD AUTO: 3.31 10E12/L (ref 3.8–5.2)
SPECIMEN SOURCE: ABNORMAL
UFH PPP CHRO-ACNC: 0.21 IU/ML
WBC # BLD AUTO: 4.1 10E9/L (ref 4–11)

## 2020-11-06 PROCEDURE — 120N000001 HC R&B MED SURG/OB

## 2020-11-06 PROCEDURE — 36415 COLL VENOUS BLD VENIPUNCTURE: CPT | Performed by: HOSPITALIST

## 2020-11-06 PROCEDURE — 250N000013 HC RX MED GY IP 250 OP 250 PS 637: Performed by: INTERNAL MEDICINE

## 2020-11-06 PROCEDURE — 250N000011 HC RX IP 250 OP 636: Performed by: INTERNAL MEDICINE

## 2020-11-06 PROCEDURE — 99233 SBSQ HOSP IP/OBS HIGH 50: CPT | Performed by: HOSPITALIST

## 2020-11-06 PROCEDURE — 85027 COMPLETE CBC AUTOMATED: CPT | Performed by: INTERNAL MEDICINE

## 2020-11-06 PROCEDURE — 85520 HEPARIN ASSAY: CPT | Performed by: INTERNAL MEDICINE

## 2020-11-06 PROCEDURE — 250N000013 HC RX MED GY IP 250 OP 250 PS 637: Performed by: HOSPITALIST

## 2020-11-06 PROCEDURE — 87040 BLOOD CULTURE FOR BACTERIA: CPT | Performed by: HOSPITALIST

## 2020-11-06 PROCEDURE — 36415 COLL VENOUS BLD VENIPUNCTURE: CPT | Performed by: INTERNAL MEDICINE

## 2020-11-06 PROCEDURE — 99231 SBSQ HOSP IP/OBS SF/LOW 25: CPT | Performed by: INTERNAL MEDICINE

## 2020-11-06 PROCEDURE — 84132 ASSAY OF SERUM POTASSIUM: CPT | Performed by: HOSPITALIST

## 2020-11-06 RX ORDER — LIDOCAINE 4 G/G
1 PATCH TOPICAL
Status: DISCONTINUED | OUTPATIENT
Start: 2020-11-06 | End: 2020-11-10 | Stop reason: HOSPADM

## 2020-11-06 RX ORDER — PANTOPRAZOLE SODIUM 40 MG/1
40 TABLET, DELAYED RELEASE ORAL
Status: DISCONTINUED | OUTPATIENT
Start: 2020-11-07 | End: 2020-11-10 | Stop reason: HOSPADM

## 2020-11-06 RX ORDER — CYCLOBENZAPRINE HCL 5 MG
10 TABLET ORAL EVERY 8 HOURS PRN
Status: DISCONTINUED | OUTPATIENT
Start: 2020-11-06 | End: 2020-11-10 | Stop reason: HOSPADM

## 2020-11-06 RX ADMIN — CYCLOBENZAPRINE HYDROCHLORIDE 10 MG: 5 TABLET, FILM COATED ORAL at 15:05

## 2020-11-06 RX ADMIN — POLYETHYLENE GLYCOL 3350 17 G: 17 POWDER, FOR SOLUTION ORAL at 08:29

## 2020-11-06 RX ADMIN — ACETAMINOPHEN 650 MG: 160 LIQUID ORAL at 23:04

## 2020-11-06 RX ADMIN — Medication 1 MG: at 00:25

## 2020-11-06 RX ADMIN — ACETAMINOPHEN 650 MG: 160 LIQUID ORAL at 00:24

## 2020-11-06 RX ADMIN — CARVEDILOL 12.5 MG: 12.5 TABLET, FILM COATED ORAL at 08:22

## 2020-11-06 RX ADMIN — LIDOCAINE 1 PATCH: 560 PATCH PERCUTANEOUS; TOPICAL; TRANSDERMAL at 23:02

## 2020-11-06 RX ADMIN — ASPIRIN 81 MG: 81 TABLET, CHEWABLE ORAL at 08:22

## 2020-11-06 RX ADMIN — HEPARIN SODIUM 800 UNITS/HR: 10000 INJECTION, SOLUTION INTRAVENOUS at 07:34

## 2020-11-06 RX ADMIN — CYCLOBENZAPRINE HYDROCHLORIDE 10 MG: 5 TABLET, FILM COATED ORAL at 08:22

## 2020-11-06 RX ADMIN — CEFTRIAXONE 2 G: 2 INJECTION, POWDER, FOR SOLUTION INTRAMUSCULAR; INTRAVENOUS at 15:02

## 2020-11-06 RX ADMIN — ROSUVASTATIN CALCIUM 40 MG: 20 TABLET, FILM COATED ORAL at 08:24

## 2020-11-06 RX ADMIN — HEPARIN SODIUM 950 UNITS/HR: 10000 INJECTION, SOLUTION INTRAVENOUS at 09:28

## 2020-11-06 RX ADMIN — CYCLOBENZAPRINE HYDROCHLORIDE 10 MG: 5 TABLET, FILM COATED ORAL at 23:04

## 2020-11-06 RX ADMIN — LISINOPRIL 20 MG: 20 TABLET ORAL at 08:22

## 2020-11-06 RX ADMIN — ACETAMINOPHEN 650 MG: 160 LIQUID ORAL at 15:05

## 2020-11-06 RX ADMIN — CARVEDILOL 12.5 MG: 12.5 TABLET, FILM COATED ORAL at 17:48

## 2020-11-06 ASSESSMENT — ACTIVITIES OF DAILY LIVING (ADL)
ADLS_ACUITY_SCORE: 17

## 2020-11-06 NOTE — PLAN OF CARE
Hep Xa level this AM: 0.28. BPA not firing in Epic. Error encountered when attempting to enter RN managed heparin infusion order. Called pharmacy and spoke with pharmacist Lalito who confirmed per hep xa level of 0.28 heparin rate is to remain at 8/hr. Heparin xa to be redrawn tomorrow AM. Order placed for redraw.

## 2020-11-06 NOTE — PLAN OF CARE
Tele NSR. Afebrile. BP up this a.m. after up to BR, normalized at noon. Chief complaint this a.m., sciatica/buttock pain R side. Flexeril ordered/given x2 . Tylenol for comfort. Good relief of pain with ambulation and medications. Heparin discontinued, ASA. ID following for + blood cx. Angiogram possibly on Monday.

## 2020-11-06 NOTE — PROGRESS NOTES
[facility]    Hospitalist Progress Note  Name: Hue Montanez    MRN: [id number]  Provider:  Reggie Layne DO, MPH  Date of Service: 11/06/2020    Summary of Stay: 75-year-old female with known CAD, hypertension, hyperlipidemia who presents with shaking chills, nausea.  She was found to have an NSTEMI and Ecoli bacteremia likely from urinary source.     #Severe sepsis secondary to Ecoli bacteremia secondary to likely urinary source: Patient did have a low-grade fever to 100.2 when she hit the floor on 11/3.  She was nontachycardic, nontachypneic.  No leukocytosis.  She did have evidence of an NSTEMI secondary to her infection.  -Blood culture drawn on admission was positive for E coli.  UA is indicative of infection.  Urine culture NGTD (urine was collected after 1st dose of ceftriaxone administered). She was started on ceftriaxone therapy.    -CT abdomen pelvis shows no obstruction, but likely right perinephric and periureteric infiltration may be related to recent obstruction or pyelonephritis.   -Continue ceftriaxone 2 g daily.  -Follow repeat blood cultures to ensure clearance.  -Stop IVF.  -Repeat culture today.  -1 of 3 blood cultures growing gram-positive cocci in clusters, strongly suspect contaminant.  Will not start vancomycin.  -Discussed with ID, if blood culture from today is negative then coronary angiogram would be appropriate on Monday.     #NSTEMI: Troponin did peak at 8.5.  Patient does have a history of CAD.  TTE did show EF of 50% with anterior regional wall abnormality.  Cardiology has suspicion for stress cardiomyopathy but cannot exclude CAD.  -Heparin drip discontinued, continue aspirin, statin.  -Started on lisinopril and coreg per cardiology.   -Cardiology consulted.  -Would be appropriate for coronary angiogram on Monday from an infection standpoint.     #Incidental pancreatic lesion: Non emergent follow-up MRI as outpatient.    Chronic Med Conditions  #HTN: BP OK,  started coreg and lisinopril.    DVT Prophylaxis: Heparin SQ  Code Status: Full Code  Diet: Low Saturated Fat Na <2400 mg    Rodriguez Catheter: not present  Disposition: Expected discharge in 3 days to home. Goals prior to discharge include manage infection and cardiac work up.   Incidental Findings: As above.  Family updated today: No.       Interval History   The patient reports doing well. No chest pain or shortness of breath. No nausea, vomiting, diarrhea, constipation. No fevers. No other specific complaints identified.     -Data reviewed today: I personally reviewed all new labs and imaging results over the last 24 hours.     Physical Exam   Temp: 97.7  F (36.5  C) Temp src: Oral BP: 110/71 Pulse: 62   Resp: 18 SpO2: 96 % O2 Device: None (Room air)    Vitals:    11/03/20 0040 11/03/20 1354   Weight: 83 kg (183 lb) 79.5 kg (175 lb 3.2 oz)     Vital Signs with Ranges  Temp:  [97.6  F (36.4  C)-98.2  F (36.8  C)] 97.7  F (36.5  C)  Pulse:  [62-74] 62  Resp:  [16-20] 18  BP: (110-151)/(68-81) 110/71  SpO2:  [96 %-99 %] 96 %  I/O last 3 completed shifts:  In: 240 [P.O.:240]  Out: -     GENERAL: No apparent distress. Awake, alert, and fully oriented.  HEENT: Normocephalic, atraumatic. Extraocular movements intact.  CARDIOVASCULAR: Regular rate and rhythm without murmurs or rubs. No S3.  PULMONARY: Clear bilaterally.  GASTROINTESTINAL: Soft, non-tender, non-distended. Bowel sounds normoactive.   EXTREMITIES: No cyanosis or clubbing. No edema.  NEUROLOGICAL: CN 2-12 grossly intact, no focal neurological deficits.  DERMATOLOGICAL: No rash, ulcer, bruising, nor jaundice.     Medications     - MEDICATION INSTRUCTIONS -       - MEDICATION INSTRUCTIONS -         aspirin  81 mg Oral Daily     carvedilol  12.5 mg Oral BID w/meals     cefTRIAXone  2 g Intravenous Q24H     lisinopril  20 mg Oral Daily     nitroGLYcerin in D5W         pantoprazole (PROTONIX) IV  40 mg Intravenous Daily with breakfast     polyethylene glycol  17 g  Oral Daily     rosuvastatin  40 mg Oral Daily     senna-docusate  1 tablet Oral BID    Or     senna-docusate  2 tablet Oral BID     sodium chloride (PF)  3 mL Intracatheter Q8H     sodium chloride (PF)  3 mL Intracatheter Q8H     Data     Laboratory:  Recent Labs   Lab 11/06/20  0708 11/05/20  0656 11/04/20  0642   WBC 4.1 6.1 8.9   HGB 9.4* 10.6* 12.1   HCT 29.8* 34.2* 39.3   MCV 90 91 91    204 222     Recent Labs   Lab 11/05/20  0656 11/04/20  0642 11/03/20  0045    141 141   POTASSIUM 3.2* 3.4 4.0   CHLORIDE 110* 108 106   CO2 23 26 27   ANIONGAP 10 7 8   * 120* 123*   BUN 13 15 25   CR 0.79 0.94 1.06*   GFRESTIMATED 73 58* 51*   GFRESTBLACK 84 68 59*   RIN 8.1* 8.5 9.0     Recent Labs   Lab 11/04/20  0642 11/04/20  0634 11/04/20  0250 11/03/20  1329   CULT No growth after 2 days Cultured on the 1st day of incubation:  Gram positive cocci in clusters  *  Critical Value/Significant Value, preliminary result only, called to and read back by  Sagar Sigala at 1332 11.5.20 KZ    (Note)  POSITIVE for Staphylococci other than S.aureus, S.epidermidis and  S.lugdunensis, by Surface Tension multiplex nucleic acid test.  Coagulase-negative staphylococci are the most common venipuncture or  collection associated skin CONTAMINANTS grown in blood cultures.  Final identification and antimicrobial susceptibility testing will be  verified by standard methods.    Specimen tested with Verigene multiplex, gram-positive blood culture  nucleic acid test for the following targets: Staph aureus, Staph  epidermidis, Staph lugdunensis, other Staph species, Enterococcus  faecalis, Enterococcus faecium, Streptococcus species, S. agalactiae,  S. anginosus grp., S. pneumoniae, S. pyogenes, Listeria sp., mecA  (methicillin resistance) and Jacques/B (vancomycin resistance).    Critical Value/Significant Value called to and read back by Sagar Garrido, RN 1629 11.05.2020 NM   <10,000 colonies/mL  mixed urogenital  linda  Susceptibility testing not routinely done   Cultured on the 1st day of incubation:  Escherichia coli  *  Critical Value/Significant Value, preliminary result only, called to and read back by  Tara Watt RN at 0123 on 11.4.20 by SS.    (Note)  POSITIVE for E.COLI by Verigene multiplex nucleic acid test. Final  identification and antimicrobial susceptibility testing will be  verified by standard methods. Verigene test will not distinguish  E.coli from Shigella species including S.dysenteriae, S.flexneri,  S.boydii, and S.sonnei. Specimens containing Shigella species or  E.coli will be reported as Positive for E.coli.    Specimen tested with Verigene multiplex, gram-negative blood culture  nucleic acid test for the following targets: Acinetobacter sp.,  Citrobacter sp., Enterobacter sp., Proteus sp., E. coli, K.  pneumoniae/oxytoca, P. aeruginosa, and the following resistance  markers: CTXM, KPC, NDM, VIM, IMP and OXA.    Critical Value/Significant Value called to and read back by  Ernestina Feliciano RN at 0342 11.4.20. amd         Imaging:  No results found for this or any previous visit (from the past 24 hour(s)).      Reggie Layne DO MPH  [facility] Hospitalist  [address] [place] [address]  Pager: [phone]  11/06/2020

## 2020-11-06 NOTE — PLAN OF CARE
A&Ox4. VSS ex htn. Tele: SR. SBA w/ GB. LS clear. On IV saline locked, other IV infusing Heparin 8ml/hr, recheck hep 10a in AM.LS clear, denies SOB. Appetite good. Ambulated in halls. C/o mild heartburn, IV protonix ordered and given. Crush pills in pudding/applesauce per pt request. Cardiology following. Will continue with POC. discharge plan tbd    Sagar Rosario RN on 11/5/2020 at 10:33 PM

## 2020-11-06 NOTE — PLAN OF CARE
VSS. Tele SR. Pt had one episode of chills overnight - no fever. Heparin at 8/hr. Pt reports some back pain overnight - PRN tylenol and heating pad helpful. Pt oriented and able to make needs known. Plan to continue ceftriaxone and follow blood cultures. Possible cardiac intervention once infection clears.

## 2020-11-06 NOTE — PROGRESS NOTES
[facility]  Cardiology Progress Note    Outpatient cardiologist:  Will be Dr. Olvera    Date of Service (when I saw the patient): 11/06/2020    Summary: Hue Montanez is a 75 year old female with history of myocardial infarction in 2013 or 2014 though cardiac cath without significant narrowings, hypertension and dyslipidemia, who was admitted on 11/3/2020 with onset of shaking chills, nausea and vomiting.       Interval History   Tele: SR, 5 beats NSVT 11/4/20     No chest pain.          Assessment & Plan   NSTEMI  - Presented with chills, nausea, vomiting.  No chest pain  - ECG without acute changes  - trop 8.5  - Echo 11/3/20:  LVEF 45-50%, Grade I or early diastolic dysfunction. There is moderate to severe hypokineis of the mid anteroseptal wall as well as the mid to apcial anterior wall.   - Currently on heparin.  She remains stable and has been on heparin x >48 hours.  Can use ASA alone.   - Coreg 12.5 mg BID, lisinopril 20 mg, Crestor 40 mg  - Diagnostic coronary angiography when IM feels sepsis cleared.   - ?Stress cardiomyopathy in setting of sepsis      HTN  - Home meds lisinopril 40 mg, metoprolol 12.5 mg BID  - Currently on Coreg 12.5 mg BID, lisinopril 20 mg and BP controlled after meds      Dyslipidemia  - FLP 6/19/18 (Care everywhere): , HDL 66, LDL 88, TG 79  - Started Crestor 40 mg  - FLP/ALT in 6-8 weeks      E coli urosepsis  - On abx  - Another blood culture set drawn today      Brook Keith PA-C    Cardiology staff  I have personally examined the patient, reviewed her history, labs and discussed management with both the patient, her  and . Her lungs are clear. Her heart tones regular. She is free of chest pain, as she has been since admission. I am most suspicious that she has a stress related/Takotsubo cardiomyopathy in association with her urosepsis. She is on standard medication for ACS and is now off IV UFH. We are waiting for her  sepsis to clear and plan diagnostic CAG likely early next week.  May      Patient Active Problem List   Diagnosis     NSTEMI (non-ST elevated myocardial infarction) (H)       Physical Exam   Temp: 97.7  F (36.5  C) Temp src: Oral BP: 110/71 Pulse: 62   Resp: 18 SpO2: 96 % O2 Device: None (Room air)    Vitals:    11/03/20 0040 11/03/20 1354   Weight: 83 kg (183 lb) 79.5 kg (175 lb 3.2 oz)     Vital Signs with Ranges  Temp:  [97.6  F (36.4  C)-98.2  F (36.8  C)] 97.7  F (36.5  C)  Pulse:  [62-74] 62  Resp:  [16-20] 18  BP: (110-151)/(68-81) 110/71  SpO2:  [96 %-99 %] 96 %  I/O last 3 completed shifts:  In: 240 [P.O.:240]  Out: -     Constitutional: NAD.   Respiratory: CTAB.   Cardiovascular: RRR, s1s2, no sig murmur  GI: soft, BS+  Skin: warm, no rashes  Musculoskeletal: Moving all extremities  Neurologic: Alert, oriented x 3  Neuropsychiatric: Normal affect       Data   Recent Labs   Lab 11/06/20  0708 11/05/20  0656 11/04/20  0642 11/04/20  0149 11/03/20  2242 11/03/20  1840 11/03/20  0045 11/03/20  0045   WBC 4.1 6.1 8.9  --   --   --    < > 9.6   HGB 9.4* 10.6* 12.1  --   --   --    < > 12.1   MCV 90 91 91  --   --   --    < > 88    204 222  --   --   --    < > 269   NA  --  143 141  --   --   --   --  141   POTASSIUM  --  3.2* 3.4  --   --   --   --  4.0   CHLORIDE  --  110* 108  --   --   --   --  106   CO2  --  23 26  --   --   --   --  27   BUN  --  13 15  --   --   --   --  25   CR  --  0.79 0.94  --   --   --   --  1.06*   ANIONGAP  --  10 7  --   --   --   --  8   RIN  --  8.1* 8.5  --   --   --   --  9.0   GLC  --  108* 120*  --   --   --   --  123*   ALBUMIN  --   --   --   --   --   --   --  3.6   PROTTOTAL  --   --   --   --   --   --   --  7.4   BILITOTAL  --   --   --   --   --   --   --  0.4   ALKPHOS  --   --   --   --   --   --   --  53   ALT  --   --   --   --   --   --   --  18   AST  --   --   --   --   --   --   --  18   LIPASE  --   --   --   --   --   --   --  119   TROPI  --   --    --  7.444* 8.358* 8.544*   < > 0.791*    < > = values in this interval not displayed.     No results found for this or any previous visit (from the past 24 hour(s)).    Echo 11/3/20:  Interpretation Summary  The left ventricle is normal in size. Left ventricular systolic function is  mildly reduced. The visual ejection fraction is estimated at 45-50%. Grade I  or early diastolic dysfunction. There is moderate to severe hypokineis of the  mid anteroseptal wall as well as the mid to apcial anterior wall. There is no  thrombus seen in the left ventricle.  The right ventricle is normal size. The right ventricular systolic function is  normal.  Trace to mild mitral and tricuspid regurgitation.  No pericardial effusion.  No previous study for comparison.        Medications     - MEDICATION INSTRUCTIONS -       - MEDICATION INSTRUCTIONS -         aspirin  81 mg Oral Daily     carvedilol  12.5 mg Oral BID w/meals     cefTRIAXone  2 g Intravenous Q24H     lisinopril  20 mg Oral Daily     nitroGLYcerin in D5W         pantoprazole (PROTONIX) IV  40 mg Intravenous Daily with breakfast     polyethylene glycol  17 g Oral Daily     rosuvastatin  40 mg Oral Daily     senna-docusate  1 tablet Oral BID    Or     senna-docusate  2 tablet Oral BID     sodium chloride (PF)  3 mL Intracatheter Q8H     sodium chloride (PF)  3 mL Intracatheter Q8H

## 2020-11-07 LAB
ANION GAP SERPL CALCULATED.3IONS-SCNC: 8 MMOL/L (ref 3–14)
BUN SERPL-MCNC: 13 MG/DL (ref 7–30)
CALCIUM SERPL-MCNC: 8.6 MG/DL (ref 8.5–10.1)
CHLORIDE SERPL-SCNC: 112 MMOL/L (ref 94–109)
CO2 SERPL-SCNC: 25 MMOL/L (ref 20–32)
CREAT SERPL-MCNC: 0.83 MG/DL (ref 0.52–1.04)
ERYTHROCYTE [DISTWIDTH] IN BLOOD BY AUTOMATED COUNT: 13.2 % (ref 10–15)
GFR SERPL CREATININE-BSD FRML MDRD: 68 ML/MIN/{1.73_M2}
GLUCOSE SERPL-MCNC: 95 MG/DL (ref 70–99)
HCT VFR BLD AUTO: 29.4 % (ref 35–47)
HGB BLD-MCNC: 9.5 G/DL (ref 11.7–15.7)
MCH RBC QN AUTO: 29.1 PG (ref 26.5–33)
MCHC RBC AUTO-ENTMCNC: 32.3 G/DL (ref 31.5–36.5)
MCV RBC AUTO: 90 FL (ref 78–100)
PLATELET # BLD AUTO: 219 10E9/L (ref 150–450)
POTASSIUM SERPL-SCNC: 3.6 MMOL/L (ref 3.4–5.3)
RBC # BLD AUTO: 3.26 10E12/L (ref 3.8–5.2)
SODIUM SERPL-SCNC: 145 MMOL/L (ref 133–144)
WBC # BLD AUTO: 3.5 10E9/L (ref 4–11)

## 2020-11-07 PROCEDURE — 80048 BASIC METABOLIC PNL TOTAL CA: CPT | Performed by: HOSPITALIST

## 2020-11-07 PROCEDURE — 250N000011 HC RX IP 250 OP 636: Performed by: INTERNAL MEDICINE

## 2020-11-07 PROCEDURE — 250N000013 HC RX MED GY IP 250 OP 250 PS 637: Performed by: INTERNAL MEDICINE

## 2020-11-07 PROCEDURE — 250N000013 HC RX MED GY IP 250 OP 250 PS 637: Performed by: HOSPITALIST

## 2020-11-07 PROCEDURE — 85027 COMPLETE CBC AUTOMATED: CPT | Performed by: HOSPITALIST

## 2020-11-07 PROCEDURE — 36415 COLL VENOUS BLD VENIPUNCTURE: CPT | Performed by: HOSPITALIST

## 2020-11-07 PROCEDURE — 99233 SBSQ HOSP IP/OBS HIGH 50: CPT | Performed by: HOSPITALIST

## 2020-11-07 PROCEDURE — 120N000001 HC R&B MED SURG/OB

## 2020-11-07 RX ORDER — HYDROCORTISONE ACETATE 25 MG/1
25 SUPPOSITORY RECTAL 2 TIMES DAILY
Status: DISCONTINUED | OUTPATIENT
Start: 2020-11-07 | End: 2020-11-10 | Stop reason: HOSPADM

## 2020-11-07 RX ADMIN — ROSUVASTATIN CALCIUM 40 MG: 20 TABLET, FILM COATED ORAL at 09:31

## 2020-11-07 RX ADMIN — CARVEDILOL 12.5 MG: 12.5 TABLET, FILM COATED ORAL at 09:32

## 2020-11-07 RX ADMIN — HYDROCORTISONE ACETATE 25 MG: 25 SUPPOSITORY RECTAL at 12:52

## 2020-11-07 RX ADMIN — ACETAMINOPHEN 650 MG: 160 LIQUID ORAL at 17:08

## 2020-11-07 RX ADMIN — PANTOPRAZOLE SODIUM 40 MG: 40 TABLET, DELAYED RELEASE ORAL at 06:43

## 2020-11-07 RX ADMIN — ASPIRIN 81 MG: 81 TABLET, CHEWABLE ORAL at 09:31

## 2020-11-07 RX ADMIN — HYDROCORTISONE ACETATE 25 MG: 25 SUPPOSITORY RECTAL at 20:30

## 2020-11-07 RX ADMIN — LISINOPRIL 20 MG: 20 TABLET ORAL at 09:31

## 2020-11-07 RX ADMIN — LIDOCAINE 1 PATCH: 560 PATCH PERCUTANEOUS; TOPICAL; TRANSDERMAL at 20:40

## 2020-11-07 RX ADMIN — POLYETHYLENE GLYCOL 3350 17 G: 17 POWDER, FOR SOLUTION ORAL at 09:34

## 2020-11-07 RX ADMIN — Medication 1 MG: at 01:11

## 2020-11-07 RX ADMIN — ACETAMINOPHEN 650 MG: 160 LIQUID ORAL at 09:32

## 2020-11-07 RX ADMIN — CARVEDILOL 12.5 MG: 12.5 TABLET, FILM COATED ORAL at 17:08

## 2020-11-07 RX ADMIN — CYCLOBENZAPRINE HYDROCHLORIDE 10 MG: 5 TABLET, FILM COATED ORAL at 20:40

## 2020-11-07 RX ADMIN — CEFTRIAXONE 2 G: 2 INJECTION, POWDER, FOR SOLUTION INTRAMUSCULAR; INTRAVENOUS at 15:42

## 2020-11-07 ASSESSMENT — ACTIVITIES OF DAILY LIVING (ADL)
ADLS_ACUITY_SCORE: 17

## 2020-11-07 NOTE — PROGRESS NOTES
"[facility]    Hospitalist Progress Note  Name: Hue Montanez    MRN: [id number]  Provider:  Reggie Layne DO, MPH  Date of Service: 11/07/2020    Summary of Stay: 75-year-old female with known CAD, hypertension, hyperlipidemia who presents with shaking chills, nausea.  She was found to have an NSTEMI and Ecoli bacteremia likely from urinary source.     #Severe sepsis secondary to Ecoli bacteremia secondary to likely urinary source: Patient did have a low-grade fever to 100.2 when she hit the floor on 11/3.  She was nontachycardic, nontachypneic.  No leukocytosis.  She did have evidence of an NSTEMI secondary to her infection.  -Blood culture drawn on admission was positive for E coli.  UA is indicative of infection.  Urine culture NGTD (urine was collected after 1st dose of ceftriaxone administered). She was started on ceftriaxone therapy.    -CT abdomen pelvis shows no obstruction, but likely right perinephric and periureteric infiltration may be related to recent obstruction or pyelonephritis.   -Continue ceftriaxone 2 g daily.  -Repeat culture 11/6.  -1 of 3 blood cultures growing Staph hominis, strongly suspect contaminant.  Will not start vancomycin.  -Discussed with ID, if blood culture from 11/6 is negative then coronary angiogram would be appropriate on Monday.     #NSTEMI: Troponin did peak at 8.5.  Patient does have a history of CAD.  TTE did show EF of 50% with anterior regional wall abnormality.  Cardiology has suspicion for stress cardiomyopathy but cannot exclude CAD.  -Heparin drip discontinued, continue aspirin, statin.  -Started on lisinopril and coreg per cardiology.   -Cardiology consulted.  -Would be appropriate for coronary angiogram on Monday from an infection standpoint.     #Incidental pancreatic lesion: Non emergent follow-up MRI as outpatient.    #Visual change: Had cataract surgery 1 year ago, also a \"clot burst\" in her eye and ophthalmologist did not want to do " anything about this at the time.  Now reporting some mild blurriness of right eye.  Neuro exam unremarkable.  For now, will address ID and cardiac issues.  Needs to follow-up with ophthalmology as outpatient.    Chronic Med Conditions  #HTN: BP reasonable, started coreg and lisinopril.    DVT Prophylaxis: Heparin SQ  Code Status: Full Code  Diet: Low Saturated Fat Na <2400 mg    Rodriguez Catheter: not present  Disposition: Expected discharge in 2-3 days to home. Goals prior to discharge include manage infection and cardiac work up.   Incidental Findings: As above.  Family updated today: No.       Interval History   The patient reports doing well. No chest pain or shortness of breath. No nausea, vomiting, diarrhea, constipation. No fevers. No other specific complaints identified.     -Data reviewed today: I personally reviewed all new labs and imaging results over the last 24 hours.     Physical Exam   Temp: 97.9  F (36.6  C) Temp src: Oral BP: (!) 146/81 Pulse: 70   Resp: 18 SpO2: 94 % O2 Device: None (Room air)    Vitals:    11/03/20 0040 11/03/20 1354   Weight: 83 kg (183 lb) 79.5 kg (175 lb 3.2 oz)     Vital Signs with Ranges  Temp:  [97.4  F (36.3  C)-98.4  F (36.9  C)] 97.9  F (36.6  C)  Pulse:  [62-73] 70  Resp:  [18-20] 18  BP: (110-149)/(63-86) 146/81  SpO2:  [94 %-99 %] 94 %  I/O last 3 completed shifts:  In: 640 [P.O.:640]  Out: 1750 [Urine:1750]    GENERAL: No apparent distress. Awake, alert, and fully oriented.  HEENT: Normocephalic, atraumatic. Extraocular movements intact.  CARDIOVASCULAR: Regular rate and rhythm without murmurs or rubs. No S3.  PULMONARY: Clear bilaterally.  GASTROINTESTINAL: Soft, non-tender, non-distended. Bowel sounds normoactive.   EXTREMITIES: No cyanosis or clubbing. No edema.  NEUROLOGICAL: CN 2-12 grossly intact, no focal neurological deficits.  DERMATOLOGICAL: No rash, ulcer, bruising, nor jaundice.     Medications     - MEDICATION INSTRUCTIONS -       - MEDICATION INSTRUCTIONS  -         aspirin  81 mg Oral Daily     carvedilol  12.5 mg Oral BID w/meals     cefTRIAXone  2 g Intravenous Q24H     lidocaine  1 patch Transdermal Q24h    And     lidocaine   Transdermal Q8H     lisinopril  20 mg Oral Daily     nitroGLYcerin in D5W         pantoprazole  40 mg Oral QAM AC     polyethylene glycol  17 g Oral Daily     rosuvastatin  40 mg Oral Daily     senna-docusate  1 tablet Oral BID    Or     senna-docusate  2 tablet Oral BID     sodium chloride (PF)  3 mL Intracatheter Q8H     sodium chloride (PF)  3 mL Intracatheter Q8H     Data     Laboratory:  Recent Labs   Lab 11/07/20  0600 11/06/20  0708 11/05/20  0656   WBC 3.5* 4.1 6.1   HGB 9.5* 9.4* 10.6*   HCT 29.4* 29.8* 34.2*   MCV 90 90 91    213 204     Recent Labs   Lab 11/07/20  0600 11/06/20  1353 11/05/20  0656 11/04/20  0642   *  --  143 141   POTASSIUM 3.6 3.5 3.2* 3.4   CHLORIDE 112*  --  110* 108   CO2 25  --  23 26   ANIONGAP 8  --  10 7   GLC 95  --  108* 120*   BUN 13  --  13 15   CR 0.83  --  0.79 0.94   GFRESTIMATED 68  --  73 58*   GFRESTBLACK 79  --  84 68   RIN 8.6  --  8.1* 8.5     Recent Labs   Lab 11/06/20  1107 11/04/20  0642 11/04/20  0634 11/04/20  0250 11/03/20  1329   CULT No growth after 18 hours No growth after 3 days Cultured on the 1st day of incubation:  Staphylococcus hominis  *  Critical Value/Significant Value, preliminary result only, called to and read back by  Sagar Sigala at 1332 11.5.20 KZ    (Note)  POSITIVE for Staphylococci other than S.aureus, S.epidermidis and  S.lugdunensis, by [employer] multiplex nucleic acid test.  Coagulase-negative staphylococci are the most common venipuncture or  collection associated skin CONTAMINANTS grown in blood cultures.  Final identification and antimicrobial susceptibility testing will be  verified by standard methods.    Specimen tested with [employer] multiplex, gram-positive blood culture  nucleic acid test for the following targets: Staph aureus,  Staph  epidermidis, Staph lugdunensis, other Staph species, Enterococcus  faecalis, Enterococcus faecium, Streptococcus species, S. agalactiae,  S. anginosus grp., S. pneumoniae, S. pyogenes, Listeria sp., mecA  (methicillin resistance) and Jacques/B (vancomycin resistance).    Critical Value/Significant Value called to and read back by Sagar Garrido RN 1629 11.05.2020 NM   <10,000 colonies/mL  mixed urogenital linda  Susceptibility testing not routinely done   Cultured on the 1st day of incubation:  Escherichia coli  *  Critical Value/Significant Value, preliminary result only, called to and read back by  Tara Watt RN at 0123 on 11.4.20 by .    (Note)  POSITIVE for E.COLI by [employer] multiplex nucleic acid test. Final  identification and antimicrobial susceptibility testing will be  verified by standard methods. Verigene test will not distinguish  E.coli from Shigella species including S.dysenteriae, S.flexneri,  S.boydii, and S.sonnei. Specimens containing Shigella species or  E.coli will be reported as Positive for E.coli.    Specimen tested with Verigene multiplex, gram-negative blood culture  nucleic acid test for the following targets: Acinetobacter sp.,  Citrobacter sp., Enterobacter sp., Proteus sp., E. coli, K.  pneumoniae/oxytoca, P. aeruginosa, and the following resistance  markers: CTXM, KPC, NDM, VIM, IMP and OXA.    Critical Value/Significant Value called to and read back by  Ernestina Feliciano RN at 0342 11.4.20. amd         Imaging:  No results found for this or any previous visit (from the past 24 hour(s)).      Reggie Layne DO MPH  [facility] Hospitalist  [address] [place] [address]  Pager: [phone]  11/07/2020

## 2020-11-07 NOTE — PLAN OF CARE
A&O. VSS. C/o lower back pain- PRN tylenol and flexeril given. Lidocaine patch ordered and placed. ANGELES clear. Tele SR. Rocephin for UTI/bacteremia. Plan for possible angiogram on Monday.  Tatiana Melchor RN on 11/7/2020 at 4:15 AM

## 2020-11-08 LAB — POTASSIUM SERPL-SCNC: 3.8 MMOL/L (ref 3.4–5.3)

## 2020-11-08 PROCEDURE — 250N000011 HC RX IP 250 OP 636: Performed by: INTERNAL MEDICINE

## 2020-11-08 PROCEDURE — 250N000013 HC RX MED GY IP 250 OP 250 PS 637: Performed by: INTERNAL MEDICINE

## 2020-11-08 PROCEDURE — 84132 ASSAY OF SERUM POTASSIUM: CPT | Performed by: HOSPITALIST

## 2020-11-08 PROCEDURE — 120N000001 HC R&B MED SURG/OB

## 2020-11-08 PROCEDURE — 250N000013 HC RX MED GY IP 250 OP 250 PS 637: Performed by: HOSPITALIST

## 2020-11-08 PROCEDURE — 99232 SBSQ HOSP IP/OBS MODERATE 35: CPT | Performed by: HOSPITALIST

## 2020-11-08 PROCEDURE — 36415 COLL VENOUS BLD VENIPUNCTURE: CPT | Performed by: HOSPITALIST

## 2020-11-08 RX ORDER — OXYCODONE HYDROCHLORIDE 5 MG/1
5 TABLET ORAL EVERY 4 HOURS PRN
Status: DISCONTINUED | OUTPATIENT
Start: 2020-11-08 | End: 2020-11-10 | Stop reason: HOSPADM

## 2020-11-08 RX ORDER — TEMAZEPAM 7.5 MG/1
7.5 CAPSULE ORAL
Status: DISCONTINUED | OUTPATIENT
Start: 2020-11-08 | End: 2020-11-10 | Stop reason: HOSPADM

## 2020-11-08 RX ORDER — NALOXONE HYDROCHLORIDE 0.4 MG/ML
.1-.4 INJECTION, SOLUTION INTRAMUSCULAR; INTRAVENOUS; SUBCUTANEOUS
Status: DISCONTINUED | OUTPATIENT
Start: 2020-11-08 | End: 2020-11-10 | Stop reason: HOSPADM

## 2020-11-08 RX ADMIN — CYCLOBENZAPRINE HYDROCHLORIDE 10 MG: 5 TABLET, FILM COATED ORAL at 08:04

## 2020-11-08 RX ADMIN — CEFTRIAXONE 2 G: 2 INJECTION, POWDER, FOR SOLUTION INTRAMUSCULAR; INTRAVENOUS at 15:55

## 2020-11-08 RX ADMIN — HYDROCORTISONE ACETATE 25 MG: 25 SUPPOSITORY RECTAL at 08:10

## 2020-11-08 RX ADMIN — ACETAMINOPHEN 650 MG: 160 LIQUID ORAL at 08:10

## 2020-11-08 RX ADMIN — HYDROCORTISONE ACETATE 25 MG: 25 SUPPOSITORY RECTAL at 20:15

## 2020-11-08 RX ADMIN — PANTOPRAZOLE SODIUM 40 MG: 40 TABLET, DELAYED RELEASE ORAL at 06:45

## 2020-11-08 RX ADMIN — OXYCODONE HYDROCHLORIDE 5 MG: 5 TABLET ORAL at 13:54

## 2020-11-08 RX ADMIN — TEMAZEPAM 7.5 MG: 7.5 CAPSULE ORAL at 22:25

## 2020-11-08 RX ADMIN — ROSUVASTATIN CALCIUM 40 MG: 20 TABLET, FILM COATED ORAL at 08:05

## 2020-11-08 RX ADMIN — CARVEDILOL 12.5 MG: 12.5 TABLET, FILM COATED ORAL at 08:05

## 2020-11-08 RX ADMIN — DICLOFENAC SODIUM 4 G: 10 GEL TOPICAL at 11:46

## 2020-11-08 RX ADMIN — LISINOPRIL 20 MG: 20 TABLET ORAL at 08:05

## 2020-11-08 RX ADMIN — ASPIRIN 81 MG: 81 TABLET, CHEWABLE ORAL at 08:00

## 2020-11-08 RX ADMIN — ACETAMINOPHEN 650 MG: 160 LIQUID ORAL at 02:14

## 2020-11-08 RX ADMIN — OXYCODONE HYDROCHLORIDE 5 MG: 5 TABLET ORAL at 22:24

## 2020-11-08 RX ADMIN — DICLOFENAC SODIUM 4 G: 10 GEL TOPICAL at 20:17

## 2020-11-08 RX ADMIN — POLYETHYLENE GLYCOL 3350 17 G: 17 POWDER, FOR SOLUTION ORAL at 08:05

## 2020-11-08 RX ADMIN — CARVEDILOL 12.5 MG: 12.5 TABLET, FILM COATED ORAL at 17:06

## 2020-11-08 ASSESSMENT — ACTIVITIES OF DAILY LIVING (ADL)
ADLS_ACUITY_SCORE: 17

## 2020-11-08 NOTE — PROGRESS NOTES
"[facility]    Hospitalist Progress Note  Name: Hue Montanez    MRN: [id number]  Provider:  Reggie Layne DO, MPH  Date of Service: 11/08/2020    Summary of Stay: 75-year-old female with known CAD, hypertension, hyperlipidemia who presents with shaking chills, nausea.  She was found to have an NSTEMI and Ecoli bacteremia likely from urinary source.     #Severe sepsis secondary to Ecoli bacteremia secondary to likely urinary source: Patient did have a low-grade fever to 100.2 when she hit the floor on 11/3.  She was nontachycardic, nontachypneic.  No leukocytosis.  She did have evidence of an NSTEMI secondary to her infection.  -Blood culture drawn on admission was positive for E coli.  UA is indicative of infection.  Urine culture NGTD (urine was collected after 1st dose of ceftriaxone administered). She was started on ceftriaxone therapy.    -CT abdomen pelvis shows no obstruction, but likely right perinephric and periureteric infiltration may be related to recent obstruction or pyelonephritis.   -Continue ceftriaxone 2 g daily.  -Repeat culture 11/6.  -1 of 3 blood cultures growing Staph hominis, strongly suspect contaminant.  Will not start vancomycin.  -Discussed with ID, if blood culture from 11/6 is negative then coronary angiogram would be appropriate on Monday.     #NSTEMI: Troponin did peak at 8.5.  Patient does have a history of CAD.  TTE did show EF of 50% with anterior regional wall abnormality.  Cardiology has suspicion for stress cardiomyopathy but cannot exclude CAD.  -Heparin drip discontinued, continue aspirin, statin.  -Started on lisinopril and coreg per cardiology.   -Cardiology consulted.  -Would be appropriate for coronary angiogram on Monday from an infection standpoint.     #Incidental pancreatic lesion: Non emergent follow-up MRI as outpatient.    #Visual change: Had cataract surgery 1 year ago, also a \"clot burst\" in her eye and ophthalmologist did not want to do " anything about this at the time.  Now reporting some mild blurriness of right eye.  Neuro exam unremarkable.  For now, will address ID and cardiac issues.  Needs to follow-up with ophthalmology as outpatient.    Chronic Med Conditions  #HTN: BP reasonable, started coreg and lisinopril.    DVT Prophylaxis: Heparin SQ  Code Status: Full Code  Diet: Low Saturated Fat Na <2400 mg    Rodriguez Catheter: not present  Disposition: Expected discharge in 2-3 days to home. Goals prior to discharge include manage infection and cardiac work up.   Incidental Findings: As above.  Family updated today: No.       Interval History   The patient reports doing well. No chest pain or shortness of breath. No nausea, vomiting, diarrhea, constipation. No fevers. No other specific complaints identified.     -Data reviewed today: I personally reviewed all new labs and imaging results over the last 24 hours.     Physical Exam   Temp: 97.6  F (36.4  C) Temp src: Oral BP: 135/74 Pulse: 60   Resp: 18 SpO2: 96 % O2 Device: None (Room air)    Vitals:    11/03/20 0040 11/03/20 1354   Weight: 83 kg (183 lb) 79.5 kg (175 lb 3.2 oz)     Vital Signs with Ranges  Temp:  [97.6  F (36.4  C)-98.1  F (36.7  C)] 97.6  F (36.4  C)  Pulse:  [60-68] 60  Resp:  [18-24] 18  BP: (121-149)/(74-80) 135/74  SpO2:  [93 %-96 %] 96 %  I/O last 3 completed shifts:  In: 1020 [P.O.:1020]  Out: 2000 [Urine:2000]    GENERAL: No apparent distress. Awake, alert, and fully oriented.  HEENT: Normocephalic, atraumatic. Extraocular movements intact.  CARDIOVASCULAR: Regular rate and rhythm without murmurs or rubs. No S3.  PULMONARY: Clear bilaterally.  GASTROINTESTINAL: Soft, non-tender, non-distended. Bowel sounds normoactive.   EXTREMITIES: No cyanosis or clubbing. No edema.  NEUROLOGICAL: CN 2-12 grossly intact, no focal neurological deficits.  DERMATOLOGICAL: No rash, ulcer, bruising, nor jaundice.     Medications     - MEDICATION INSTRUCTIONS -       - MEDICATION INSTRUCTIONS -          aspirin  81 mg Oral Daily     carvedilol  12.5 mg Oral BID w/meals     cefTRIAXone  2 g Intravenous Q24H     diclofenac  4 g Topical 4x Daily     hydrocortisone  25 mg Rectal BID     lidocaine  1 patch Transdermal Q24h    And     lidocaine   Transdermal Q8H     lisinopril  20 mg Oral Daily     nitroGLYcerin in D5W         pantoprazole  40 mg Oral QAM AC     polyethylene glycol  17 g Oral Daily     rosuvastatin  40 mg Oral Daily     senna-docusate  1 tablet Oral BID    Or     senna-docusate  2 tablet Oral BID     sodium chloride (PF)  3 mL Intracatheter Q8H     Data     Laboratory:  Recent Labs   Lab 11/07/20  0600 11/06/20  0708 11/05/20  0656   WBC 3.5* 4.1 6.1   HGB 9.5* 9.4* 10.6*   HCT 29.4* 29.8* 34.2*   MCV 90 90 91    213 204     Recent Labs   Lab 11/08/20  0607 11/07/20  0600 11/06/20  1353 11/05/20  0656 11/04/20  0642   NA  --  145*  --  143 141   POTASSIUM 3.8 3.6 3.5 3.2* 3.4   CHLORIDE  --  112*  --  110* 108   CO2  --  25  --  23 26   ANIONGAP  --  8  --  10 7   GLC  --  95  --  108* 120*   BUN  --  13  --  13 15   CR  --  0.83  --  0.79 0.94   GFRESTIMATED  --  68  --  73 58*   GFRESTBLACK  --  79  --  84 68   RIN  --  8.6  --  8.1* 8.5     Recent Labs   Lab 11/06/20  1107 11/04/20  0642 11/04/20  0634 11/04/20  0250 11/03/20  1329   CULT No growth after 2 days No growth after 4 days Cultured on the 1st day of incubation:  Staphylococcus hominis  *  Critical Value/Significant Value, preliminary result only, called to and read back by  Sagar Sigala at 1332 11.5.20 KZ    (Note)  POSITIVE for Staphylococci other than S.aureus, S.epidermidis and  S.lugdunensis, by [employer] multiplex nucleic acid test.  Coagulase-negative staphylococci are the most common venipuncture or  collection associated skin CONTAMINANTS grown in blood cultures.  Final identification and antimicrobial susceptibility testing will be  verified by standard methods.    Specimen tested with [employer],  gram-positive blood culture  nucleic acid test for the following targets: Staph aureus, Staph  epidermidis, Staph lugdunensis, other Staph species, Enterococcus  faecalis, Enterococcus faecium, Streptococcus species, S. agalactiae,  S. anginosus grp., S. pneumoniae, S. pyogenes, Listeria sp., mecA  (methicillin resistance) and Jacques/B (vancomycin resistance).    Critical Value/Significant Value called to and read back by Sagar Garrido RN 1629 11.05.2020 NM   <10,000 colonies/mL  mixed urogenital linda  Susceptibility testing not routinely done   Cultured on the 1st day of incubation:  Escherichia coli  *  Critical Value/Significant Value, preliminary result only, called to and read back by  Tara Watt RN at 0123 on 11.4.20 by SS.    (Note)  POSITIVE for E.COLI by [employer]ig[employer] multiplex nucleic acid test. Final  identification and antimicrobial susceptibility testing will be  verified by standard methods. Verigene test will not distinguish  E.coli from Shigella species including S.dysenteriae, S.flexneri,  S.boydii, and S.sonnei. Specimens containing Shigella species or  E.coli will be reported as Positive for E.coli.    Specimen tested with Verigene multiplex, gram-negative blood culture  nucleic acid test for the following targets: Acinetobacter sp.,  Citrobacter sp., Enterobacter sp., Proteus sp., E. coli, K.  pneumoniae/oxytoca, P. aeruginosa, and the following resistance  markers: CTXM, KPC, NDM, VIM, IMP and OXA.    Critical Value/Significant Value called to and read back by  Ernestina Feliciano RN at 0342 11.4.20. amd         Imaging:  No results found for this or any previous visit (from the past 24 hour(s)).      Reggie Layne DO MPH  [facility] Hospitalist  [address] [place] [address]  Pager: [phone]  11/08/2020

## 2020-11-08 NOTE — PLAN OF CARE
A&O. VSS. C/o left back/buttocks pain. Lidocaine patch in place. Tylenol and Flexeril given x1. Tele SR. Independent. Treating UTI w/ Rocephin. Plan for angiogram on Monday.  Tatiana Melchor RN on 11/8/2020 at 4:59 AM

## 2020-11-08 NOTE — PLAN OF CARE
No significant change. No chest discomfort. Up ad gia. Voltaren/flexeril/tylenol for sciatic pain. Pt may request oxycodone at bedtime. Tele NSR/SB. Potassium 3.8, no replacement needed. Oxycodone during day shift x1 w/pain decreased. Coronary angiogram to be reevaluated Monday.

## 2020-11-08 NOTE — PROGRESS NOTES
Patient not seen today.  No charge.    Patient is chest pain-free, hemodynamically stable.  Seen by Dr. Olvera on 11/6/2020 with the plan that when she recovers from sepsis, diagnostic coronary angiography will be considered.    Please page cardiology when sepsis resolved.    Dr. LYLY Nelson MD [facility]  Cardiology

## 2020-11-09 ENCOUNTER — SURGERY (OUTPATIENT)
Age: 75
End: 2020-11-09
Payer: MEDICARE

## 2020-11-09 LAB
ANION GAP SERPL CALCULATED.3IONS-SCNC: 6 MMOL/L (ref 3–14)
BUN SERPL-MCNC: 14 MG/DL (ref 7–30)
CALCIUM SERPL-MCNC: 8.6 MG/DL (ref 8.5–10.1)
CHLORIDE SERPL-SCNC: 110 MMOL/L (ref 94–109)
CO2 SERPL-SCNC: 27 MMOL/L (ref 20–32)
CREAT SERPL-MCNC: 0.83 MG/DL (ref 0.52–1.04)
ERYTHROCYTE [DISTWIDTH] IN BLOOD BY AUTOMATED COUNT: 12.7 % (ref 10–15)
GFR SERPL CREATININE-BSD FRML MDRD: 69 ML/MIN/{1.73_M2}
GLUCOSE SERPL-MCNC: 96 MG/DL (ref 70–99)
HCT VFR BLD AUTO: 32.4 % (ref 35–47)
HGB BLD-MCNC: 10.3 G/DL (ref 11.7–15.7)
MCH RBC QN AUTO: 28.5 PG (ref 26.5–33)
MCHC RBC AUTO-ENTMCNC: 31.8 G/DL (ref 31.5–36.5)
MCV RBC AUTO: 90 FL (ref 78–100)
PLATELET # BLD AUTO: 277 10E9/L (ref 150–450)
POTASSIUM SERPL-SCNC: 3.9 MMOL/L (ref 3.4–5.3)
RBC # BLD AUTO: 3.62 10E12/L (ref 3.8–5.2)
SODIUM SERPL-SCNC: 143 MMOL/L (ref 133–144)
WBC # BLD AUTO: 4.4 10E9/L (ref 4–11)

## 2020-11-09 PROCEDURE — B211YZZ FLUOROSCOPY OF MULTIPLE CORONARY ARTERIES USING OTHER CONTRAST: ICD-10-PCS | Performed by: INTERNAL MEDICINE

## 2020-11-09 PROCEDURE — 99152 MOD SED SAME PHYS/QHP 5/>YRS: CPT | Performed by: INTERNAL MEDICINE

## 2020-11-09 PROCEDURE — 250N000013 HC RX MED GY IP 250 OP 250 PS 637: Performed by: HOSPITALIST

## 2020-11-09 PROCEDURE — 80048 BASIC METABOLIC PNL TOTAL CA: CPT | Performed by: HOSPITALIST

## 2020-11-09 PROCEDURE — C1769 GUIDE WIRE: HCPCS | Performed by: INTERNAL MEDICINE

## 2020-11-09 PROCEDURE — 250N000011 HC RX IP 250 OP 636: Performed by: INTERNAL MEDICINE

## 2020-11-09 PROCEDURE — 93458 L HRT ARTERY/VENTRICLE ANGIO: CPT | Performed by: INTERNAL MEDICINE

## 2020-11-09 PROCEDURE — 120N000001 HC R&B MED SURG/OB

## 2020-11-09 PROCEDURE — 250N000013 HC RX MED GY IP 250 OP 250 PS 637: Performed by: INTERNAL MEDICINE

## 2020-11-09 PROCEDURE — 36415 COLL VENOUS BLD VENIPUNCTURE: CPT | Performed by: HOSPITALIST

## 2020-11-09 PROCEDURE — 99232 SBSQ HOSP IP/OBS MODERATE 35: CPT | Performed by: HOSPITALIST

## 2020-11-09 PROCEDURE — 93458 L HRT ARTERY/VENTRICLE ANGIO: CPT | Mod: 26 | Performed by: INTERNAL MEDICINE

## 2020-11-09 PROCEDURE — 85027 COMPLETE CBC AUTOMATED: CPT | Performed by: HOSPITALIST

## 2020-11-09 PROCEDURE — 99152 MOD SED SAME PHYS/QHP 5/>YRS: CPT | Mod: 59 | Performed by: INTERNAL MEDICINE

## 2020-11-09 PROCEDURE — B215YZZ FLUOROSCOPY OF LEFT HEART USING OTHER CONTRAST: ICD-10-PCS | Performed by: INTERNAL MEDICINE

## 2020-11-09 PROCEDURE — 258N000003 HC RX IP 258 OP 636: Performed by: INTERNAL MEDICINE

## 2020-11-09 PROCEDURE — 99153 MOD SED SAME PHYS/QHP EA: CPT | Performed by: INTERNAL MEDICINE

## 2020-11-09 PROCEDURE — 99232 SBSQ HOSP IP/OBS MODERATE 35: CPT | Mod: 25 | Performed by: INTERNAL MEDICINE

## 2020-11-09 PROCEDURE — 250N000009 HC RX 250: Performed by: INTERNAL MEDICINE

## 2020-11-09 PROCEDURE — 272N000001 HC OR GENERAL SUPPLY STERILE: Performed by: INTERNAL MEDICINE

## 2020-11-09 PROCEDURE — C1894 INTRO/SHEATH, NON-LASER: HCPCS | Performed by: INTERNAL MEDICINE

## 2020-11-09 RX ORDER — SODIUM CHLORIDE 9 MG/ML
INJECTION, SOLUTION INTRAVENOUS CONTINUOUS
Status: DISCONTINUED | OUTPATIENT
Start: 2020-11-09 | End: 2020-11-10 | Stop reason: HOSPADM

## 2020-11-09 RX ORDER — FENTANYL CITRATE 50 UG/ML
25-50 INJECTION, SOLUTION INTRAMUSCULAR; INTRAVENOUS
Status: ACTIVE | OUTPATIENT
Start: 2020-11-09 | End: 2020-11-09

## 2020-11-09 RX ORDER — VERAPAMIL HYDROCHLORIDE 2.5 MG/ML
INJECTION, SOLUTION INTRAVENOUS
Status: DISCONTINUED
Start: 2020-11-09 | End: 2020-11-09 | Stop reason: HOSPADM

## 2020-11-09 RX ORDER — NITROGLYCERIN 5 MG/ML
VIAL (ML) INTRAVENOUS
Status: DISCONTINUED
Start: 2020-11-09 | End: 2020-11-09 | Stop reason: HOSPADM

## 2020-11-09 RX ORDER — ACETAMINOPHEN 325 MG/1
650 TABLET ORAL EVERY 4 HOURS PRN
Status: DISCONTINUED | OUTPATIENT
Start: 2020-11-09 | End: 2020-11-10 | Stop reason: HOSPADM

## 2020-11-09 RX ORDER — FENTANYL CITRATE 50 UG/ML
INJECTION, SOLUTION INTRAMUSCULAR; INTRAVENOUS
Status: DISCONTINUED
Start: 2020-11-09 | End: 2020-11-09 | Stop reason: HOSPADM

## 2020-11-09 RX ORDER — NALOXONE HYDROCHLORIDE 0.4 MG/ML
.2-.4 INJECTION, SOLUTION INTRAMUSCULAR; INTRAVENOUS; SUBCUTANEOUS
Status: DISCONTINUED | OUTPATIENT
Start: 2020-11-09 | End: 2020-11-09

## 2020-11-09 RX ORDER — ASPIRIN 81 MG/1
TABLET, CHEWABLE ORAL
Status: DISCONTINUED | OUTPATIENT
Start: 2020-11-09 | End: 2020-11-09 | Stop reason: HOSPADM

## 2020-11-09 RX ORDER — LIDOCAINE HYDROCHLORIDE 10 MG/ML
INJECTION, SOLUTION EPIDURAL; INFILTRATION; INTRACAUDAL; PERINEURAL
Status: DISCONTINUED
Start: 2020-11-09 | End: 2020-11-09 | Stop reason: HOSPADM

## 2020-11-09 RX ORDER — FENTANYL CITRATE 50 UG/ML
INJECTION, SOLUTION INTRAMUSCULAR; INTRAVENOUS
Status: DISCONTINUED | OUTPATIENT
Start: 2020-11-09 | End: 2020-11-09 | Stop reason: HOSPADM

## 2020-11-09 RX ORDER — NITROGLYCERIN 0.4 MG/1
TABLET SUBLINGUAL
Status: DISCONTINUED | OUTPATIENT
Start: 2020-11-09 | End: 2020-11-09 | Stop reason: HOSPADM

## 2020-11-09 RX ORDER — IOPAMIDOL 755 MG/ML
INJECTION, SOLUTION INTRAVASCULAR
Status: DISCONTINUED | OUTPATIENT
Start: 2020-11-09 | End: 2020-11-09 | Stop reason: HOSPADM

## 2020-11-09 RX ORDER — HEPARIN SODIUM 1000 [USP'U]/ML
INJECTION, SOLUTION INTRAVENOUS; SUBCUTANEOUS
Status: DISCONTINUED
Start: 2020-11-09 | End: 2020-11-09 | Stop reason: HOSPADM

## 2020-11-09 RX ORDER — ATROPINE SULFATE 0.1 MG/ML
0.5 INJECTION INTRAVENOUS
Status: ACTIVE | OUTPATIENT
Start: 2020-11-09 | End: 2020-11-09

## 2020-11-09 RX ORDER — NITROGLYCERIN 0.4 MG/1
TABLET SUBLINGUAL
Status: DISCONTINUED
Start: 2020-11-09 | End: 2020-11-09 | Stop reason: HOSPADM

## 2020-11-09 RX ORDER — ASPIRIN 81 MG/1
TABLET, CHEWABLE ORAL
Status: DISCONTINUED
Start: 2020-11-09 | End: 2020-11-09 | Stop reason: HOSPADM

## 2020-11-09 RX ORDER — FLUMAZENIL 0.1 MG/ML
0.2 INJECTION, SOLUTION INTRAVENOUS
Status: ACTIVE | OUTPATIENT
Start: 2020-11-09 | End: 2020-11-09

## 2020-11-09 RX ADMIN — ROSUVASTATIN CALCIUM 40 MG: 20 TABLET, FILM COATED ORAL at 14:12

## 2020-11-09 RX ADMIN — DICLOFENAC SODIUM 4 G: 10 GEL TOPICAL at 08:14

## 2020-11-09 RX ADMIN — SODIUM CHLORIDE: 9 INJECTION, SOLUTION INTRAVENOUS at 14:05

## 2020-11-09 RX ADMIN — FENTANYL CITRATE 50 MCG: 50 INJECTION, SOLUTION INTRAMUSCULAR; INTRAVENOUS at 11:26

## 2020-11-09 RX ADMIN — LIDOCAINE HYDROCHLORIDE 5 ML: 10 INJECTION, SOLUTION EPIDURAL; INFILTRATION; INTRACAUDAL; PERINEURAL at 11:24

## 2020-11-09 RX ADMIN — MIDAZOLAM 1 MG: 1 INJECTION INTRAMUSCULAR; INTRAVENOUS at 11:21

## 2020-11-09 RX ADMIN — LISINOPRIL 20 MG: 20 TABLET ORAL at 14:13

## 2020-11-09 RX ADMIN — Medication 1 MG: at 22:14

## 2020-11-09 RX ADMIN — OXYCODONE HYDROCHLORIDE 5 MG: 5 TABLET ORAL at 22:14

## 2020-11-09 RX ADMIN — LIDOCAINE HYDROCHLORIDE 10 ML: 10 INJECTION, SOLUTION EPIDURAL; INFILTRATION; INTRACAUDAL; PERINEURAL at 11:39

## 2020-11-09 RX ADMIN — LIDOCAINE HYDROCHLORIDE 5 MG: 10 INJECTION, SOLUTION EPIDURAL; INFILTRATION; INTRACAUDAL; PERINEURAL at 11:32

## 2020-11-09 RX ADMIN — ASPIRIN 81 MG CHEWABLE TABLET 324 MG: 81 TABLET CHEWABLE at 11:12

## 2020-11-09 RX ADMIN — PANTOPRAZOLE SODIUM 40 MG: 40 TABLET, DELAYED RELEASE ORAL at 14:13

## 2020-11-09 RX ADMIN — NITROGLYCERIN 0.4 MG: 0.4 TABLET SUBLINGUAL at 11:33

## 2020-11-09 RX ADMIN — OXYCODONE HYDROCHLORIDE 5 MG: 5 TABLET ORAL at 12:45

## 2020-11-09 RX ADMIN — DICLOFENAC SODIUM 4 G: 10 GEL TOPICAL at 20:34

## 2020-11-09 RX ADMIN — FENTANYL CITRATE 50 MCG: 50 INJECTION, SOLUTION INTRAMUSCULAR; INTRAVENOUS at 11:21

## 2020-11-09 RX ADMIN — IOPAMIDOL 50 ML: 755 INJECTION, SOLUTION INTRAVENOUS at 11:58

## 2020-11-09 RX ADMIN — MIDAZOLAM 1 MG: 1 INJECTION INTRAMUSCULAR; INTRAVENOUS at 11:27

## 2020-11-09 RX ADMIN — CARVEDILOL 12.5 MG: 12.5 TABLET, FILM COATED ORAL at 14:13

## 2020-11-09 RX ADMIN — CEFTRIAXONE 2 G: 2 INJECTION, POWDER, FOR SOLUTION INTRAMUSCULAR; INTRAVENOUS at 16:09

## 2020-11-09 ASSESSMENT — ACTIVITIES OF DAILY LIVING (ADL)
ADLS_ACUITY_SCORE: 17
ADLS_ACUITY_SCORE: 14
ADLS_ACUITY_SCORE: 17
ADLS_ACUITY_SCORE: 17
ADLS_ACUITY_SCORE: 14

## 2020-11-09 NOTE — PRE-PROCEDURE
GENERAL PRE-PROCEDURE:   Procedure:  Cor angio possible PCI  Date/Time:  11/9/2020 11:09 AM    Written consent obtained?: Yes    Risks and benefits: Risks, benefits and alternatives were discussed    Consent given by:  Patient  Patient states understanding of procedure being performed: Yes    Patient's understanding of procedure matches consent: Yes    Procedure consent matches procedure scheduled: Yes    Appropriately NPO:  Yes  ASA Class:  Class 4- Severe systemic disease, acute unstable problems  Mallampati  :  Grade 2- soft palate, base of uvula, tonsillar pillars, and portion of posterior pharyngeal wall visible  History & Physical reviewed:  History and physical reviewed and no updates needed  Statement of review:  I have reviewed the lab findings, diagnostic data, medications, and the plan for sedation

## 2020-11-09 NOTE — PLAN OF CARE
A&O. VSS. C/o sciatic pain- oxy and Voltaren gel given. Pt declined Lidocaine patch. Restoril given at HS. Tele SR. +2 BLE edema. Independent. Treating UTI w/ Rocephin. Cardiology following. NPO for possible angiogram today.  Tatiana Melchor RN on 11/9/2020 at 5:14 AM

## 2020-11-09 NOTE — PROGRESS NOTES
[facility]    Hospitalist Progress Note  Name: Hue Montanez    MRN: [id number]  Provider:  Reggie Layne DO, MPH  Date of Service: 11/09/2020    Summary of Stay: 75-year-old female with known CAD, hypertension, hyperlipidemia who presents with shaking chills, nausea.  She was found to have an NSTEMI and Ecoli bacteremia from urinary source.  Radiology has been consulted.  She has troponin elevation peaking around 8.  Echocardiogram showed EF 50% with anterior wall motion abnormality.  Unfortunately developed E. coli bacteremia requiring serial blood cultures and ongoing ceftriaxone.  Blood cultures have now cleared and plan for coronary angiogram and ischemic evaluation today.     #Severe sepsis secondary to Ecoli bacteremia secondary to likely urinary source: Patient did have a low-grade fever to 100.2 when she was admitted on 11/3.  She was nontachycardic, nontachypneic.  No leukocytosis.  She did have evidence of an NSTEMI presumably due to her infection.  -Blood culture drawn on admission was positive for E coli.  UA is indicative of infection.  Urine culture NGTD (urine was collected after 1st dose of ceftriaxone administered).  She was started on ceftriaxone therapy.    -CT abdomen pelvis shows no obstruction, but likely right perinephric and periureteric infiltration may be related to recent obstruction or pyelonephritis.   -Continue ceftriaxone 2 g daily.  -Repeat culture 11/6 no growth.  -1 of 3 blood cultures growing Staph hominis, strongly suspect contaminant.  Will not start vancomycin follow-up cultures negative.  -Discussed with ID, if blood culture from 11/6 is negative then coronary angiogram would be appropriate on Monday.     #NSTEMI: Troponin did peak at 8.5.  Patient does have a history of CAD.  TTE did show EF of 50% with anterior regional wall abnormality.  Cardiology has suspicion for stress cardiomyopathy but cannot exclude CAD.  -Heparin drip discontinued, continue  "aspirin, statin.  -Started on lisinopril and coreg per cardiology.   -Would be appropriate for coronary angiogram on Monday from an infection standpoint.     #Incidental pancreatic lesion: Non emergent follow-up MRI as outpatient.    #Visual change: Had cataract surgery 1 year ago, also a \"clot burst\" in her eye and ophthalmologist did not want to do anything about this at the time.  Now reporting some mild blurriness of right eye but stable.  Neuro exam unremarkable.  For now, will address ID and cardiac issues.  Needs to follow-up with ophthalmology as outpatient.    Chronic Med Conditions  #HTN: BP reasonable, started coreg and lisinopril.    DVT Prophylaxis: Heparin SQ  Code Status: Full Code  Diet: NPO for Medical/Clinical Reasons Except for: Meds, Ice Chips    Rodriguez Catheter: not present  Disposition: Expected discharge in 1-2 days to home. Goals prior to discharge include manage infection and ischemic evaluation.   Incidental Findings: As above.  Family updated today: No.       Interval History   The patient reports doing well. No chest pain or shortness of breath. No nausea, vomiting, diarrhea, constipation. No fevers. No other specific complaints identified.     -Data reviewed today: I personally reviewed all new labs and imaging results over the last 24 hours.     Physical Exam   Temp: 97.6  F (36.4  C) Temp src: Oral BP: (!) 142/69 Pulse: 62   Resp: 16 SpO2: 94 % O2 Device: Nasal cannula Oxygen Delivery: 2.5 LPM  Vitals:    11/03/20 0040 11/03/20 1354   Weight: 83 kg (183 lb) 79.5 kg (175 lb 3.2 oz)     Vital Signs with Ranges  Temp:  [97.5  F (36.4  C)-98.2  F (36.8  C)] 97.6  F (36.4  C)  Pulse:  [60-65] 62  Resp:  [16-24] 16  BP: (116-152)/(67-89) 142/69  SpO2:  [94 %-96 %] 94 %  I/O last 3 completed shifts:  In: 860 [P.O.:860]  Out: 2000 [Urine:2000]    GENERAL: No apparent distress. Awake, alert, and fully oriented.  HEENT: Normocephalic, atraumatic. Extraocular movements intact.  CARDIOVASCULAR: " Regular rate and rhythm without murmurs or rubs. No S3.  PULMONARY: Clear bilaterally.  GASTROINTESTINAL: Soft, non-tender, non-distended. Bowel sounds normoactive.   EXTREMITIES: No cyanosis or clubbing. No edema.  NEUROLOGICAL: CN 2-12 grossly intact, no focal neurological deficits.  DERMATOLOGICAL: No rash, ulcer, bruising, nor jaundice.     Medications     - MEDICATION INSTRUCTIONS -       - MEDICATION INSTRUCTIONS -         fentaNYL (PF)         heparin (porcine)         midazolam         nitroGLYcerin         nitroGLYcerin in D5W         verapamil         aspirin  81 mg Oral Daily     carvedilol  12.5 mg Oral BID w/meals     cefTRIAXone  2 g Intravenous Q24H     diclofenac  4 g Topical 4x Daily     hydrocortisone  25 mg Rectal BID     lidocaine  1 patch Transdermal Q24h    And     lidocaine   Transdermal Q8H     lisinopril  20 mg Oral Daily     pantoprazole  40 mg Oral QAM AC     polyethylene glycol  17 g Oral Daily     rosuvastatin  40 mg Oral Daily     senna-docusate  1 tablet Oral BID    Or     senna-docusate  2 tablet Oral BID     sodium chloride (PF)  3 mL Intracatheter Q8H     Data     Laboratory:  Recent Labs   Lab 11/09/20  0623 11/07/20  0600 11/06/20  0708   WBC 4.4 3.5* 4.1   HGB 10.3* 9.5* 9.4*   HCT 32.4* 29.4* 29.8*   MCV 90 90 90    219 213     Recent Labs   Lab 11/09/20  0623 11/08/20  0607 11/07/20  0600 11/05/20  0656 11/05/20  0656     --  145*  --  143   POTASSIUM 3.9 3.8 3.6   < > 3.2*   CHLORIDE 110*  --  112*  --  110*   CO2 27  --  25  --  23   ANIONGAP 6  --  8  --  10   GLC 96  --  95  --  108*   BUN 14  --  13  --  13   CR 0.83  --  0.83  --  0.79   GFRESTIMATED 69  --  68  --  73   GFRESTBLACK 80  --  79  --  84   RIN 8.6  --  8.6  --  8.1*    < > = values in this interval not displayed.     Recent Labs   Lab 11/06/20  1107 11/04/20  0642 11/04/20  0634 11/04/20  0250 11/03/20  1329   CULT No growth after 3 days No growth after 5 days Cultured on the 1st day of  incubation:  Staphylococcus hominis  *  Critical Value/Significant Value, preliminary result only, called to and read back by  Sagar Sigala at 1332 11.5.20 KZ    (Note)  POSITIVE for Staphylococci other than S.aureus, S.epidermidis and  S.lugdunensis, by Verigene multiplex nucleic acid test.  Coagulase-negative staphylococci are the most common venipuncture or  collection associated skin CONTAMINANTS grown in blood cultures.  Final identification and antimicrobial susceptibility testing will be  verified by standard methods.    Specimen tested with Verigene multiplex, gram-positive blood culture  nucleic acid test for the following targets: Staph aureus, Staph  epidermidis, Staph lugdunensis, other Staph species, Enterococcus  faecalis, Enterococcus faecium, Streptococcus species, S. agalactiae,  S. anginosus grp., S. pneumoniae, S. pyogenes, Listeria sp., mecA  (methicillin resistance) and Jacques/B (vancomycin resistance).    Critical Value/Significant Value called to and read back by Sagar Garrido RN 1629 11.05.2020 NM   <10,000 colonies/mL  mixed urogenital linda  Susceptibility testing not routinely done   Cultured on the 1st day of incubation:  Escherichia coli  *  Critical Value/Significant Value, preliminary result only, called to and read back by  Tara Watt RN at 0123 on 11.4.20 by .    (Note)  POSITIVE for E.COLI by Verigene multiplex nucleic acid test. Final  identification and antimicrobial susceptibility testing will be  verified by standard methods. Verigene test will not distinguish  E.coli from Shigella species including S.dysenteriae, S.flexneri,  S.boydii, and S.sonnei. Specimens containing Shigella species or  E.coli will be reported as Positive for E.coli.    Specimen tested with Verigene multiplex, gram-negative blood culture  nucleic acid test for the following targets: Acinetobacter sp.,  Citrobacter sp., Enterobacter sp., Proteus sp., E. coli, K.  pneumoniae/oxytoca, P.  aeruginosa, and the following resistance  markers: CTXM, KPC, NDM, VIM, IMP and OXA.    Critical Value/Significant Value called to and read back by  Ernestina Feliciano RN at 0342 11.4.20. amd         Imaging:  No results found for this or any previous visit (from the past 24 hour(s)).      Reggie Layne DO MPH  [facility] Hospitalist  [address] [place] [address]  Pager: [phone]  11/09/2020

## 2020-11-09 NOTE — PROGRESS NOTES
[facility]    Cardiology Progress Note    Primary Cardiologist: Will be Dr. Olvera    Date of Admission: 11/03/2020  Service date: 11/09/2020    Summary:  Ms. Hue Montanez is a very pleasant 75 year old female with a past medical history of IBS, GERD, hypertension, dyslipidemia, and previous MI in 2014 with coronary angiography showing no significant coronary narrowings who was admitted on 11/3/2020 for shaking chills, nausea and vomiting. Cardiology was consulted for NSTEMI.    Assessment & Plan   1. NSTEMI  - Troponin elevated to 8.4 and trended down.   - Previous coronary angiogram in 04/2014 at the [facility] showing multiple discrete 20% LAD lesions, 10% proximal CFX lesion, and 20% proximal RCA lesion.  - Started on aspirin 81 mg daily, carvedilol 12.5 mg twice daily, rosuvastatin 40 mg once daily, and lisinopril 20 mg once daily.    2. New cardiomyopathy  - TTE 11/3/20 showing EF 45-50% with moderate to severe hypokineis of the  mid anteroseptal wall as well as the mid to apcial anterior wall.   - Possible stress cardiomyopathy in the setting of sepsis. Ischemia cannot be excluded at this point.     3. Hypertension  - PTA on lisinopril 40 mg daily and metoprolol tartrate 12.5 mg BID.  - Currently on carvedilol 12.5 mg BID and lisinopril 20 mg daily.    4. Dyslipidemia   - FLP from 06/2018 in Care everywhere showing , HDL 66, LDL 88, TG 79.  - Started on rosuvastatin 40 mg once daily.  - FLP/ALT in 6-8 weeks    5. E. Coli urosepsis  - Treated on antibiotics with management per the primary team.  - Discusses with internal medicine team and felt that urosepsis now resolved.     Plan:   1. Coronary angiogram today.   2. Continue with aspirin, statin, beta-blocker, and ACE inhibitor.  3. Anticipate likely discharge home tomorrow pending coronary angiogram findings and stability post procedure.  4. Cardiology will continue to follow along.    Interval History   Patient is  resting comfortably. Feeling much better overall. She denies symptoms of chest pain, shortness of breath, or palpitations. Risks and benefits of coronary angiogram discussed today including, bleeding, bruising, infection, allergic reaction, kidney damage (including need for dialysis), stroke, heart attack, vascular damage, emergency open heart surgery, up to and including death. Patient indicates understanding and is agreeable to proceed. She has no known allergy to contrast dye.    Telemetry: NSR    Thank you for the opportunity to participate in this pleasant patient's care.     REMY Doll, CNP   Text Page  (8am - 5pm, M-F)    Patient Active Problem List   Diagnosis     NSTEMI (non-ST elevated myocardial infarction) (H)       Physical Exam   Temp: 97.6  F (36.4  C) Temp src: Oral BP: (!) 152/89 Pulse: 62   Resp: 18 SpO2: 94 % O2 Device: None (Room air)    Vitals:    11/03/20 0040 11/03/20 1354   Weight: 83 kg (183 lb) 79.5 kg (175 lb 3.2 oz)     Vital Signs with Ranges  Temp:  [97.5  F (36.4  C)-98.2  F (36.8  C)] 97.6  F (36.4  C)  Pulse:  [60-65] 62  Resp:  [18-24] 18  BP: (116-152)/(67-89) 152/89  SpO2:  [94 %-96 %] 94 %  I/O last 3 completed shifts:  In: 860 [P.O.:860]  Out: 2000 [Urine:2000]    Constitutional: Appears her stated age, well nourished, and in no acute distress.  Eyes: Pupils equal, round. Sclerae anicteric.   HEENT: Normocephalic, atraumatic.   Respiratory: Breathing non-labored. Lungs clear to auscultation bilaterally. No crackles, wheezes, rhonchi, or rales.  Cardiovascular: Regular rate and rhythm, normal S1 and S2. No murmur, rub, or gallop. Radial pulses are 2+ bilaterally.  GI: Soft, non-distended, non-tender, bowel sounds present in all four quadrants.  Skin: Warm, dry. No rashes, cyanosis, edema, or xanthelasma.  Musculoskeletal/Extremities: Moves all extremities well and symmetrically. No lower extremity edema bilaterally.  Neurologic: No gross focal deficits. Alert, awake, and  oriented to person, place and time.  Psychiatric: Affect appropriate. Mentation normal.    Medications     - MEDICATION INSTRUCTIONS -       - MEDICATION INSTRUCTIONS -         aspirin  81 mg Oral Daily     carvedilol  12.5 mg Oral BID w/meals     cefTRIAXone  2 g Intravenous Q24H     diclofenac  4 g Topical 4x Daily     hydrocortisone  25 mg Rectal BID     lidocaine  1 patch Transdermal Q24h    And     lidocaine   Transdermal Q8H     lisinopril  20 mg Oral Daily     nitroGLYcerin in D5W         pantoprazole  40 mg Oral QAM AC     polyethylene glycol  17 g Oral Daily     rosuvastatin  40 mg Oral Daily     senna-docusate  1 tablet Oral BID    Or     senna-docusate  2 tablet Oral BID     sodium chloride (PF)  3 mL Intracatheter Q8H     Data   Results for orders placed or performed during the hospital encounter of 11/03/20 (from the past 24 hour(s))   Basic metabolic panel   Result Value Ref Range    Sodium 143 133 - 144 mmol/L    Potassium 3.9 3.4 - 5.3 mmol/L    Chloride 110 (H) 94 - 109 mmol/L    Carbon Dioxide 27 20 - 32 mmol/L    Anion Gap 6 3 - 14 mmol/L    Glucose 96 70 - 99 mg/dL    Urea Nitrogen 14 7 - 30 mg/dL    Creatinine 0.83 0.52 - 1.04 mg/dL    GFR Estimate 69 >60 mL/min/[1.73_m2]    GFR Estimate If Black 80 >60 mL/min/[1.73_m2]    Calcium 8.6 8.5 - 10.1 mg/dL   CBC with platelets   Result Value Ref Range    WBC 4.4 4.0 - 11.0 10e9/L    RBC Count 3.62 (L) 3.8 - 5.2 10e12/L    Hemoglobin 10.3 (L) 11.7 - 15.7 g/dL    Hematocrit 32.4 (L) 35.0 - 47.0 %    MCV 90 78 - 100 fl    MCH 28.5 26.5 - 33.0 pg    MCHC 31.8 31.5 - 36.5 g/dL    RDW 12.7 10.0 - 15.0 %    Platelet Count 277 150 - 450 10e9/L     This note was completed in part using Dragon voice recognition software. Although reviewed after completion, some word and grammatical errors may occur.

## 2020-11-09 NOTE — PLAN OF CARE
Pt A/O x 4, VSS, pt denies headache, dizziness, N/V & SOB. Pt reports sciatic nerve pain in her back and right leg - PRN Oxycodone. Pt independent. Lung sounds clear, RA. Tele: SB. Angio scheduled for today. Will continue with plan of care.    Pt back from Angio; groin approach, no interventions, bedrest until 1400.    Loss of peripheral IV - pt difficult start, paged [employer].

## 2020-11-10 VITALS
OXYGEN SATURATION: 95 % | BODY MASS INDEX: 26.55 KG/M2 | TEMPERATURE: 98 F | HEART RATE: 64 BPM | HEIGHT: 68 IN | WEIGHT: 175.2 LBS | SYSTOLIC BLOOD PRESSURE: 126 MMHG | DIASTOLIC BLOOD PRESSURE: 64 MMHG | RESPIRATION RATE: 16 BRPM

## 2020-11-10 DIAGNOSIS — I21.4 NSTEMI (NON-ST ELEVATED MYOCARDIAL INFARCTION) (H): Primary | ICD-10-CM

## 2020-11-10 DIAGNOSIS — A41.9 SEPSIS (H): ICD-10-CM

## 2020-11-10 LAB
BACTERIA SPEC CULT: NO GROWTH
SPECIMEN SOURCE: NORMAL

## 2020-11-10 PROCEDURE — 250N000013 HC RX MED GY IP 250 OP 250 PS 637: Performed by: HOSPITALIST

## 2020-11-10 PROCEDURE — 99239 HOSP IP/OBS DSCHRG MGMT >30: CPT | Performed by: INTERNAL MEDICINE

## 2020-11-10 PROCEDURE — 250N000013 HC RX MED GY IP 250 OP 250 PS 637: Performed by: INTERNAL MEDICINE

## 2020-11-10 RX ORDER — LISINOPRIL 20 MG/1
20 TABLET ORAL DAILY
Qty: 30 TABLET | Refills: 0 | Status: SHIPPED | OUTPATIENT
Start: 2020-11-11 | End: 2020-11-24

## 2020-11-10 RX ORDER — ROSUVASTATIN CALCIUM 40 MG/1
40 TABLET, COATED ORAL DAILY
Qty: 30 TABLET | Refills: 0 | Status: SHIPPED | OUTPATIENT
Start: 2020-11-11

## 2020-11-10 RX ORDER — LEVOFLOXACIN 25 MG/ML
500 SOLUTION ORAL DAILY
Qty: 140 ML | Refills: 0 | Status: SHIPPED | OUTPATIENT
Start: 2020-11-10 | End: 2020-11-17

## 2020-11-10 RX ORDER — CARVEDILOL 12.5 MG/1
12.5 TABLET ORAL 2 TIMES DAILY WITH MEALS
Qty: 60 TABLET | Refills: 0 | Status: SHIPPED | OUTPATIENT
Start: 2020-11-10

## 2020-11-10 RX ADMIN — LISINOPRIL 20 MG: 20 TABLET ORAL at 09:20

## 2020-11-10 RX ADMIN — ROSUVASTATIN CALCIUM 40 MG: 20 TABLET, FILM COATED ORAL at 09:20

## 2020-11-10 RX ADMIN — CARVEDILOL 12.5 MG: 12.5 TABLET, FILM COATED ORAL at 09:20

## 2020-11-10 RX ADMIN — ASPIRIN 81 MG: 81 TABLET, CHEWABLE ORAL at 09:20

## 2020-11-10 ASSESSMENT — ACTIVITIES OF DAILY LIVING (ADL)
ADLS_ACUITY_SCORE: 16
ADLS_ACUITY_SCORE: 14

## 2020-11-10 NOTE — PROVIDER NOTIFICATION
RN writer spoke with Dr. Hernandez, Cardiology. He stated he will not see pt today. She will be discharged home today per hospitalist.

## 2020-11-10 NOTE — DISCHARGE SUMMARY
[facility]  Hospitalist Discharge Summary      Date of Admission:  11/3/2020  Date of Discharge:  11/10/2020  Discharging Provider: Aaron Ribeiro DO      Discharge Diagnoses   1.  Severe sepsis.  Secondary to UTI and E. coli bacteremia.  2.  UTI.  3.  E. coli bacteremia.  4.  NSTEMI.  5.  Cardiomyopathy.  6.  Hypertension.  7.  Hyperlipidemia.  8.  Incidental pancreatic lesion.    Follow-ups Needed After Discharge   Follow-up Appointments     Follow-up and recommended labs and tests       Follow up with primary care provider, Shannan Jeffrey, within 7 days   for hospital follow- up.  The following labs/tests are recommended: BMP in   7 days.  Follow-up with cardiology within 2 weeks.            Also needs to discuss timing of nonemergent outpatient pancreatic MRI with her primary care provider at time of hospital follow-up.    Discharge Disposition   Discharged to home  Condition at discharge: Stable      Hospital Course   Ms. Montanez is a 75-year-old female with known CAD, hypertension, hyperlipidemia who presents with shaking chills, nausea.  She was found to have an NSTEMI and Ecoli bacteremia from urinary source.  She was initially noted to have severe sepsis due to her UTI.  Blood cultures from 11/3 did return growing E. coli.  1 blood culture from 11/4 returned growing staph hominis.  Suspect that the staph hominis blood culture was a contaminant.  E. coli from 11/3 is likely a true pathogen from a urinary tract infection.  She has been on IV ceftriaxone for 7 days during hospital stay.  She has been improving.  Now transition to oral antibiotics with levofloxacin 500 mg a day for an additional 7 days.  Found to have elevated troponin level at time of presentation.  Troponin has peaked at 8.5.  She was seen in consultation by cardiology during hospital stay.  She did have coronary angiogram performed on 11/9.  She does have known coronary disease.  Did not have an obstructive  lesion requiring intervention on angiogram.  Cardiology is recommending continued medical management and follow-up with cardiology in the outpatient setting.  She had previously been on metoprolol.  This has been switched to carvedilol during hospital stay.  Had previously been on a statin but had stopped taking this several months ago.  Restarted on rosuvastatin 40 mg a day at this time.  During her hospital stay.  She did have a CT scan of the abdomen and pelvis.  Was noted to have an incidental pancreatic lesion.  It is recommended that she have nonemergent, outpatient MRI of the pancreas for further evaluation.  This does need to be set up by her primary care provider.    Consultations This Hospital Stay   ADVANCE DIRECTIVE IP CONSULT  PHARMACY IP CONSULT  PHARMACY IP CONSULT  CARDIOLOGY IP CONSULT  PHARMACY TO DOSE VANCO  PHARMACY IP CONSULT  PHARMACY IP CONSULT  SMOKING CESSATION PROGRAM IP CONSULT    Code Status   Full Code    Time Spent on this Encounter   I spent 40 minutes with Ms. Montanez and working on discharge on 11/10/2020.       Aaron Ribeiro, [place] [address] MEDICAL SURGICAL  [address] [place] [address]  Phone: [phone]  Fax: [phone]  ______________________________________________________________________    Physical Exam   Vital Signs: Temp: 98  F (36.7  C) Temp src: Oral BP: 126/64 Pulse: 64   Resp: 16 SpO2: 95 % O2 Device: None (Room air)    Weight: 175 lbs 3.2 oz  Gen:  NAD, A&Ox3.  Eyes:  PERRL, sclera anicteric.  OP:  MMM, no lesions.  Neck:  Supple.  CV:  Regular, no murmurs.  Lung:  CTA b/l, normal effort.  Ab:  +BS, soft.  Skin:  Warm, dry to touch.  No rash.  Ext:  No pitting edema LE b/l.         Primary Care Physician   Shannan Jeffrey    Discharge Orders      Reason for your hospital stay    UTI     Follow-up and recommended labs and tests     Follow up with primary care provider, Shannan Jeffrey, within 7 days for hospital follow-  up.  The following labs/tests are recommended: BMP in 7 days.  Follow-up with cardiology within 2 weeks.     Activity    Your activity upon discharge: activity as tolerated     Full Code     Diet    Follow this diet upon discharge: Cardiac         Discharge Medications   Current Discharge Medication List      START taking these medications    Details   carvedilol (COREG) 12.5 MG tablet Take 1 tablet (12.5 mg) by mouth 2 times daily (with meals)  Qty: 60 tablet, Refills: 0    Associated Diagnoses: NSTEMI (non-ST elevated myocardial infarction) (H)      levofloxacin (LEVAQUIN) 25 MG/ML solution Take 20 mLs (500 mg) by mouth daily for 7 days  Qty: 140 mL, Refills: 0    Associated Diagnoses: NSTEMI (non-ST elevated myocardial infarction) (H)      rosuvastatin (CRESTOR) 40 MG tablet Take 1 tablet (40 mg) by mouth daily  Qty: 30 tablet, Refills: 0    Associated Diagnoses: NSTEMI (non-ST elevated myocardial infarction) (H)         CONTINUE these medications which have CHANGED    Details   lisinopril (ZESTRIL) 20 MG tablet Take 1 tablet (20 mg) by mouth daily  Qty: 30 tablet, Refills: 0    Associated Diagnoses: NSTEMI (non-ST elevated myocardial infarction) (H)         CONTINUE these medications which have NOT CHANGED    Details   aspirin (ASA) 81 MG EC tablet Take 81 mg by mouth daily      cyclobenzaprine (FLEXERIL) 10 MG tablet Take 10 mg by mouth daily as needed for muscle spasms      docusate sodium (COLACE) 100 MG capsule Take 100 mg by mouth daily as needed for constipation         STOP taking these medications       ibuprofen (ADVIL/MOTRIN) 600 MG tablet Comments:   Reason for Stopping:         metoprolol tartrate (LOPRESSOR) 25 MG tablet Comments:   Reason for Stopping:             Allergies   Allergies   Allergen Reactions     Chlorthalidone GI Disturbance

## 2020-11-10 NOTE — PLAN OF CARE
VSS, afeb.,LS are clear, 96% ion RA. Pt had angio today. With no intervention. Pt had HR in the 50's and had several 2sec pauses, pt was asymptomatic. MD notified, and pt coreg held. Pt HR now i9n the 60's. Plan is to discharge to home tomorrow. Tele is SB/SR.

## 2020-11-10 NOTE — PROGRESS NOTES
Per Dr. Hernandez pt discharging from hospital w/ sepsis and NSTEMI. Needs 2-4 week NIKOLE follow-up.     Pt's primary cardiologist who initially consulted was Dr. Olvera. Was also seen by Gerald Mcdonough, entered order for follow-up w/ Gerald if possible.   Sent message to scheduling to setup.   Nicole RN, BSN  11/10/20 11:20 AM

## 2020-11-10 NOTE — PROGRESS NOTES
Discharge instructions reviewed with the pt and spouse. Medication change/new med instruction provided written & verbal. Pt stated full understanding of all discharge instructions. To discharge home w/spouse when RX delivered.   RX filled and given to the pt. RN writer explained that the pt will need to transfer the Levaquin RX to her pharmacy due to the full dose not filled af [facility]. Pt stated understanding. Discharged via w/c to spouse- to go home.

## 2020-11-10 NOTE — DISCHARGE INSTRUCTIONS
Going Home after Coronary Angiogram      Name: Hue Montanez  Medical Record Number:  [id number]  Today's Date: November 10, 2020        For 24 hours:         Have an adult stay with you for 24 hours.         Relax and take it easy.         Drink plenty of fluids.         You may eat your normal diet, unless your doctor tells you otherwise.         Do NOT make any important or legal decisions.         Do NOT drive or operate machines at home or at work.         Do NOT drink alcohol.      Do NOT smoke.     Call 911 right away if you have bleeding that is heavy or does not stop.     Call your doctor if:         You have a large or growing hard lump around the site.         The site is red, swollen, hot or tender.         Blood or fluid is draining from the site.         You have chills or a fever greater than 101 F (38 C).         Your leg or arm turns bluish, feels numb or cool.         You have hives, a rash or unusual itching.     [facility] [place]:   [phone] (7 days a week)        Carvedilol tablets  Brand Name: Coreg  What is this medicine?  CARVEDILOL (ADILENE ve dil ol) is a beta-blocker. Beta-blockers reduce the workload on the heart and help it to beat more regularly. This medicine is used to treat high blood pressure and heart failure.  How should I use this medicine?  Take this medicine by mouth with a glass of water. Follow the directions on the prescription label. It is best to take the tablets with food. Take your doses at regular intervals. Do not take your medicine more often than directed. Do not stop taking except on the advice of your doctor or health care professional.  Talk to your pediatrician regarding the use of this medicine in children. Special care may be needed.  What side effects may I notice from receiving this medicine?  Side effects that you should report to your doctor or health care professional as soon as possible:    allergic reactions like  skin rash, itching or hives, swelling of the face, lips, or tongue    breathing problems    dark urine    irregular heartbeat    swollen legs or ankles    vomiting    yellowing of the eyes or skin  Side effects that usually do not require medical attention (report to your doctor or health care professional if they continue or are bothersome):    change in sex drive or performance    diarrhea    dry eyes (especially if wearing contact lenses)    dry, itching skin    headache    nausea    unusually tired  What may interact with this medicine?  This medicine may interact with the following medications:    certain medicines for blood pressure, heart disease, irregular heart beat    certain medicines for depression, like fluoxetine or paroxetine    certain medicines for diabetes, like glipizide or glyburide    cimetidine    clonidine    cyclosporine    digoxin    MAOIs like Carbex, Eldepryl, Marplan, Nardil, and Parnate    reserpine    rifampin  What if I miss a dose?  If you miss a dose, take it as soon as you can. If it is almost time for your next dose, take only that dose. Do not take double or extra doses.  Where should I keep my medicine?  Keep out of the reach of children.  Store at room temperature below 30 degrees C (86 degrees F). Protect from moisture. Keep container tightly closed. Throw away any unused medicine after the expiration date.  What should I tell my health care provider before I take this medicine?  They need to know if you have any of these conditions:    circulation problems    diabetes    history of heart attack or heart disease    liver disease    lung or breathing disease, like asthma or emphysema    pheochromocytoma    slow or irregular heartbeat    thyroid disease    an unusual or allergic reaction to carvedilol, other beta-blockers, medicines, foods, dyes, or preservatives    pregnant or trying to get pregnant    breast-feeding  What should I watch for while using this medicine?  Check  your heart rate and blood pressure regularly while you are taking this medicine. Ask your doctor or health care professional what your heart rate and blood pressure should be, and when you should contact him or her. Do not stop taking this medicine suddenly. This could lead to serious heart-related effects.  Contact your doctor or health care professional if you have difficulty breathing while taking this drug.  Check your weight daily. Ask your doctor or health care professional when you should notify him/her of any weight gain.  You may get drowsy or dizzy. Do not drive, use machinery, or do anything that requires mental alertness until you know how this medicine affects you. To reduce the risk of dizzy or fainting spells, do not sit or stand up quickly. Alcohol can make you more drowsy, and increase flushing and rapid heartbeats. Avoid alcoholic drinks.  If you have diabetes, check your blood sugar as directed. Tell your doctor if you have changes in your blood sugar while you are taking this medicine.  If you are going to have surgery, tell your doctor or health care professional that you are taking this medicine.  NOTE:This sheet is a summary. It may not cover all possible information. If you have questions about this medicine, talk to your doctor, pharmacist, or health care provider. Copyright  2020 [employer]      Patient Education    Levofloxacin Ophthalmic drops, solution    Levofloxacin Oral solution    Levofloxacin Oral tablet    Levofloxacin Solution for injection    Levofloxacin, Dextrose Solution for injection  Levofloxacin Oral solution  What is this medicine?  LEVOFLOXACIN(karl voe FLOX a sin) is a quinolone antibiotic. It is used to treat certain kinds of bacterial infections. It will not work for colds, flu, or other viral infections.  This medicine may be used for other purposes; ask your health care provider or pharmacist if you have questions.  What should I tell my health care provider before I  take this medicine?  They need to know if you have any of these conditions:    bone problems    cerebral disease    history of low levels of potassium in the blood    irregular heartbeat    joint problems    kidney disease    myasthenia gravis    seizure disorder    tendon problems    an unusual or allergic reaction to levofloxacin, other quinolone antibiotics, foods, dyes, or preservatives    pregnant or trying to get pregnant    breast-feeding  How should I use this medicine?  Take this medicine by mouth 1 hour before, or 2 hours after eating. Follow the directions on the prescription label. Use a specially marked spoon to measure the dose. Household spoons are not accurate. Take the medicine at the same times each day. Finish all of the medicine even if you think you are better.  A special MedGuide will be given to you by the pharmacist with each prescription and refill. Be sure to read this information carefully each time.  Talk to your pediatrician regarding the use of this medicine in children. While this drug may be prescribed for children as young as 6 months for selected conditions, precautions do apply.  Overdosage: If you think you have taken too much of this medicine contact a poison control center or emergency room at once.  NOTE: This medicine is only for you. Do not share this medicine with others.  What if I miss a dose?  If you miss a dose, take it as soon as you can. If it is almost time for your next dose, take only that dose. Do not take double or extra doses.  What may interact with this medicine?  Do not take this medicine with any of the following medications:    arsenic trioxide    chloroquine    droperidol    medicines for irregular heart rhythm like amiodarone, disopyramide, dofetilide, flecainide, quinidine, procainamide, sotalol    some medicines for depression or mental problems like phenothiazines, pimozide, and ziprasidone  This medicine may also interact with the following  medications:    amoxapine    antacids    cisapride    dairy products    didanosine (ddI) buffered tablets or powder    haloperidol    multivitamins    NSAIDS, medicines for pain and inflammation, like ibuprofen or naproxen    retinoid products like tretinoin or isotretinoin    risperidone    some other antibiotics like clarithromycin or erythromycin    sucralfate    theophylline    warfarin  This list may not describe all possible interactions. Give your health care provider a list of all the medicines, herbs, non-prescription drugs, or dietary supplements you use. Also tell them if you smoke, drink alcohol, or use illegal drugs. Some items may interact with your medicine.  What should I watch for while using this medicine?  Tell your doctor or health care professional if your symptoms do not begin to improve in a few days. Drink several glasses of water a day and cut down on drinks that contain caffeine. You must not get dehydrated.  You may get drowsy or dizzy. Do not drive, use machinery, or do anything that needs mental alertness until you know how this medicine affects you. Do not stand or sit up quickly, especially if you are an older patient. This reduces the risk of dizzy or fainting spells.  This medicine can make you more sensitive to the sun. Keep out of the sun. If you cannot avoid being in the sun, wear protective clothing and use a sunscreen. Do not use sun lamps or tanning beds/booths. Contact your doctor if you get a sunburn.  If you are a diabetic monitor your blood glucose carefully. If you get an unusual reading stop taking this medicine and call your doctor right away.  Do not treat diarrhea with over-the-counter products. Contact your doctor if you have diarrhea that lasts more than 2 days or if the diarrhea is severe and watery.  Avoid antacids, calcium, iron, and zinc products for 2 hours before and 2 hours after taking a dose of this medicine.  What side effects may I notice from receiving  this medicine?  Side effects that you should report to your doctor or health care professional as soon as possible:    allergic reactions like skin rash or hives, swelling of the face, lips, or tongue    changes in vision    confusion, nightmares or hallucinations    difficulty breathing    irregular heartbeat, chest pain    joint, muscle or tendon pain    pain or difficulty passing urine    persistent headache with or without blurred vision    redness, blistering, peeling or loosening of the skin, including inside the mouth    seizures    unusual pain, numbness, tingling, or weakness    vaginal irritation, discharge  Side effects that usually do not require medical attention (report to your doctor or health care professional if they continue or are bothersome):    diarrhea    dry mouth    headache    stomach upset, nausea    trouble sleeping  This list may not describe all possible side effects. Call your doctor for medical advice about side effects. You may report side effects to FDA at 4-088-FDA-2333.  Where should I keep my medicine?  Keep out of the reach of children.  Store at room temperature of 15 to 30 degrees C (59 to 86 degrees F). Throw away any unused medicine after the expiration date.  NOTE:This sheet is a summary. It may not cover all possible information. If you have questions about this medicine, talk to your doctor, pharmacist, or health care provider. Copyright  2016 [employer]        Atorvastatin tablets  Brand Name: Lipitor  What is this medicine?  ATORVASTATIN (a TORE va sta tin) is known as a HMG-CoA reductase inhibitor or 'statin'. It lowers the level of cholesterol and triglycerides in the blood. This drug may also reduce the risk of heart attack, stroke, or other health problems in patients with risk factors for heart disease. Diet and lifestyle changes are often used with this drug.  How should I use this medicine?  Take this medicine by mouth with a glass of water. Follow the  directions on the prescription label. You can take it with or without food. If it upsets your stomach, take it with food. Do not take with grapefruit juice. Take your medicine at regular intervals. Do not take it more often than directed. Do not stop taking except on your doctor's advice.  Talk to your pediatrician regarding the use of this medicine in children. While this drug may be prescribed for children as young as 10 for selected conditions, precautions do apply.  What side effects may I notice from receiving this medicine?  Side effects that you should report to your doctor or health care professional as soon as possible:    allergic reactions like skin rash, itching or hives, swelling of the face, lips, or tongue    fever    joint pain    loss of memory    redness, blistering, peeling or loosening of the skin, including inside the mouth    signs and symptoms of liver injury like dark yellow or brown urine; general ill feeling or flu-like symptoms; light-belly pain; unusually weak or tired; yellowing of the eyes or skin    signs and symptoms of muscle injury like dark urine; trouble passing urine or change in the amount of urine; unusually weak or tired; muscle pain or side or back pain  Side effects that usually do not require medical attention (report to your doctor or health care professional if they continue or are bothersome):    diarrhea    nausea    stomach pain    trouble sleeping    upset stomach  What may interact with this medicine?  Do not take this medicine with any of the following medications:    certain antivirals for HIV or hepatitis    posaconazole    supplements like red yeast rice  This medicine may also interact with the following medications:    alcohol    birth control pills    certain antibiotics like erythromycin and clarithromycin    certain medicines for cholesterol like fenofibrate, gemfibrozil, and niacin    certain medicines for fungal infections like ketoconazole and  itraconazole    colchicine    cyclosporine    digoxin    grapefruit juice    rifampin    supplements like black cohosh and [place]'s wort  What if I miss a dose?  If you miss a dose, take it as soon as you can. If your next dose is to be taken in less than 12 hours, then do not take the missed dose. Take the next dose at your regular time. Do not take double or extra doses.  Where should I keep my medicine?  Keep out of the reach of children.  Store between 20 and 25 degrees C (68 and 77 degrees F). Throw away any unused medicine after the expiration date.  What should I tell my health care provider before I take this medicine?  They need to know if you have any of these conditions:    diabetes    if you often drink alcohol    history of stroke    kidney disease    liver disease    muscle aches or weakness    thyroid disease    an unusual or allergic reaction to atorvastatin, other medicines, foods, dyes, or preservatives    pregnant or trying to get pregnant    breast-feeding  What should I watch for while using this medicine?  Visit your doctor or health care professional for regular check-ups. You may need regular tests to make sure your liver is working properly.  Your health care professional may tell you to stop taking this medicine if you develop muscle problems. If your muscle problems do not go away after stopping this medicine, contact your health care professional.  Do not become pregnant while taking this medicine. Women should inform their health care professional if they wish to become pregnant or think they might be pregnant. There is a potential for serious side effects to an unborn child. Talk to your health care professional or pharmacist for more information. Do not breast-feed an infant while taking this medicine.  This medicine may affect blood sugar levels. If you have diabetes, check with your doctor or health care professional before you change your diet or the dose of your diabetic  medicine.  If you are going to need surgery or other procedure, tell your doctor that you are using this medicine.  This drug is only part of a total heart-health program. Your doctor or a dietician can suggest a low-cholesterol and low-fat diet to help. Avoid alcohol and smoking, and keep a proper exercise schedule.  NOTE:This sheet is a summary. It may not cover all possible information. If you have questions about this medicine, talk to your doctor, pharmacist, or health care provider. Copyright  2020 [employer]

## 2020-11-10 NOTE — PLAN OF CARE
Tele SB/SR. VSS. Oriented. R groin site WNL w/ bandaid in place. CMS intact. No interventions performed during angio. See results under results tab. Pt states chronic back pain is well-managed overnight. Lung sounds clear. IV rocephin continues for UTI. Blood cultures from 11/6 showing no growth. Plan for possible discharge today.

## 2020-11-11 ENCOUNTER — TELEPHONE (OUTPATIENT)
Dept: CARDIOLOGY | Facility: CLINIC | Age: 75
End: 2020-11-11

## 2020-11-11 LAB
BACTERIA SPEC CULT: ABNORMAL
SPECIMEN SOURCE: ABNORMAL

## 2020-11-11 NOTE — TELEPHONE ENCOUNTER
Patient was evaluated by cardiology while inpatient for chills, N/V, urosepsis, new CM, elevated troponin-NSTEMI. PMH: GERD, HTN, HLD, MI. TTE 11/3/20 showing EF 45-50% with moderate to severe hypokineis of the mid anteroseptal wall as well as the mid to apical anterior wall. 11/9/20: Coronary angiogram via RFA showed non obstructive CAD-continued medical management. Cardiomyopathy probable stress related to sepsis. Started on Coreg, ABX and Crestor prior to discharge. Discontinue PTA Metoprolol. Writer attempted to call patient to discuss any post hospital d/c questions she may have, review medication changes, and confirm f/u appts, but no answer. VM left to return my phone call. RN will confirm with patient that she has an OV scheduled on 11/24/20 at 1530 with NIKOLE Gerald Mcdonough at our [place] Office.  MARGARITA Rudd RN.

## 2020-11-12 LAB
BACTERIA SPEC CULT: NO GROWTH
SPECIMEN SOURCE: NORMAL

## 2020-11-12 NOTE — TELEPHONE ENCOUNTER
Second attempt at trying to contact pt, but again, no answer. VM left to return my phone call. Message left reminding pt of f/u OV as below. MARGARITA Rudd RN.

## 2020-11-20 DIAGNOSIS — I21.4 NSTEMI (NON-ST ELEVATED MYOCARDIAL INFARCTION) (H): Primary | ICD-10-CM

## 2020-11-20 NOTE — TELEPHONE ENCOUNTER
Writer called pt and she denies any chest pain, SOB, fever or persistent lightheadedness. Denies any medication questions. RFA access site is healing without signs of infection, swelling or drainage. Reviewed lab and f/u cardiology NIKOLE OV dates and times. Pt verbalized understanding without further questions. MARGARITA Rudd RN.

## 2020-11-23 DIAGNOSIS — I21.4 NSTEMI (NON-ST ELEVATED MYOCARDIAL INFARCTION) (H): ICD-10-CM

## 2020-11-23 LAB
ANION GAP SERPL CALCULATED.3IONS-SCNC: 4 MMOL/L (ref 3–14)
BUN SERPL-MCNC: 23 MG/DL (ref 7–30)
CALCIUM SERPL-MCNC: 9 MG/DL (ref 8.5–10.1)
CHLORIDE SERPL-SCNC: 109 MMOL/L (ref 94–109)
CO2 SERPL-SCNC: 27 MMOL/L (ref 20–32)
CREAT SERPL-MCNC: 0.98 MG/DL (ref 0.52–1.04)
GFR SERPL CREATININE-BSD FRML MDRD: 56 ML/MIN/{1.73_M2}
GLUCOSE SERPL-MCNC: 89 MG/DL (ref 70–99)
POTASSIUM SERPL-SCNC: 4 MMOL/L (ref 3.4–5.3)
SODIUM SERPL-SCNC: 140 MMOL/L (ref 133–144)

## 2020-11-23 PROCEDURE — 36415 COLL VENOUS BLD VENIPUNCTURE: CPT | Performed by: NURSE PRACTITIONER

## 2020-11-23 PROCEDURE — 80048 BASIC METABOLIC PNL TOTAL CA: CPT | Mod: QW | Performed by: NURSE PRACTITIONER

## 2020-11-23 NOTE — PATIENT INSTRUCTIONS
Thank you for coming into the [facility] today.    Today's plan:   1. Increase the dose of lisinopril from 20 mg to 40 mg once daily.  2. Check labs in 1-2 weeks after increasing the dose to reassess your kidney function and electrolytes.  3. Continue to monitor your blood pressure at home and call if it continues to run consistently over 130/85 so that further adjustments can be made.  4. Follow up with Dr. Olvera in 2-3 months with a repeat echocardiogram beforehand.    If you have questions or concerns, please call my nurse team at [phone].    Scheduling phone number: [phone]    It was a pleasure seeing you today!     Gerald Mcdonough, Nurse Practitioner  [facility]  November 24, 2020  ________________________________________________________

## 2020-11-24 ENCOUNTER — OFFICE VISIT (OUTPATIENT)
Dept: CARDIOLOGY | Facility: CLINIC | Age: 75
End: 2020-11-24
Payer: MEDICARE

## 2020-11-24 VITALS
WEIGHT: 160 LBS | HEIGHT: 68 IN | BODY MASS INDEX: 24.25 KG/M2 | HEART RATE: 53 BPM | OXYGEN SATURATION: 97 % | DIASTOLIC BLOOD PRESSURE: 65 MMHG | SYSTOLIC BLOOD PRESSURE: 145 MMHG

## 2020-11-24 DIAGNOSIS — I10 ESSENTIAL HYPERTENSION: ICD-10-CM

## 2020-11-24 DIAGNOSIS — I21.4 NSTEMI (NON-ST ELEVATED MYOCARDIAL INFARCTION) (H): ICD-10-CM

## 2020-11-24 DIAGNOSIS — E78.5 DYSLIPIDEMIA: ICD-10-CM

## 2020-11-24 DIAGNOSIS — I25.10 MILD CORONARY ARTERY DISEASE: ICD-10-CM

## 2020-11-24 DIAGNOSIS — I42.8 NON-ISCHEMIC CARDIOMYOPATHY (H): Primary | ICD-10-CM

## 2020-11-24 DIAGNOSIS — A41.51 SEPSIS DUE TO ESCHERICHIA COLI, UNSPECIFIED WHETHER ACUTE ORGAN DYSFUNCTION PRESENT (H): ICD-10-CM

## 2020-11-24 PROCEDURE — 99214 OFFICE O/P EST MOD 30 MIN: CPT | Performed by: NURSE PRACTITIONER

## 2020-11-24 RX ORDER — OMEPRAZOLE 20 MG/1
20 TABLET, DELAYED RELEASE ORAL PRN
COMMUNITY

## 2020-11-24 RX ORDER — LISINOPRIL 40 MG/1
40 TABLET ORAL DAILY
Qty: 90 TABLET | Refills: 3 | Status: SHIPPED | OUTPATIENT
Start: 2020-11-24

## 2020-11-24 RX ORDER — OMEGA-3 FATTY ACIDS/FISH OIL 300-1000MG
200 CAPSULE ORAL EVERY 4 HOURS PRN
COMMUNITY

## 2020-11-24 SDOH — HEALTH STABILITY: MENTAL HEALTH: HOW OFTEN DO YOU HAVE A DRINK CONTAINING ALCOHOL?: NEVER

## 2020-11-24 ASSESSMENT — MIFFLIN-ST. JEOR: SCORE: 1264.26

## 2020-11-24 NOTE — LETTER
11/24/2020    Shannan Jeffrey MD  [facility] [address]  [facility] [address]    RE: Hue Montanez       Dear Colleague,    I had the pleasure of seeing Hue Montanez in the [facility].    Cardiology Clinic Progress Note    Service Date: November 24, 2020    PRIMARY CARDIOLOGIST: Dr. Olvera      REASON FOR VISIT: Hospital follow up    HPI:   I had the pleasure of seeing Ms. Hue Montanez in the clinic today. She is a very pleasant 75 year old female with a past medical history notable for IBS, GERD, hypertension, dyslipidemia, and previous MI in 2014 with coronary angiography showing no significant coronary narrowings. She was recently admitted to [facility] on 11/3/2020 after presenting with shaking chills, nausea, and vomiting. She was found to have E. Coli urosepsis and was treated with IV antibiotics. She was found to have a NSTEMI with a troponin elevation to a peak of 8.4 and trended down. A new cardiomyopathy was also found with an echocardiogram on 11/03/2020 showing EF 45-50% with moderate to severe hypokineis of the mid anteroseptal wall as well as the mid to apcial anterior wall. This was felt to be a possible stress cardiomyopathy in the setting of urosepsis. Coronary angiography was completed on 11/09/2020 demonstrating non-occlusive coronary artery disease with a 20% LAD stenosis and 10% proximal RCA stenosis.    Today, she presents to the clinic in follow up of her recent hospitalization.  She tells me that she has been feeling okay since she has been home from the hospital.  She has struggled with chronic low back pain and feels that this has been worse following her hospital stay.  She was recently seen by her chiropractor and feels like this helped some.  She has also been using ibuprofen as needed with some relief.  She otherwise denies chest, neck, jaw, or arm pain.  She has not had shortness of  breath, dyspnea on exertion, orthopnea, PND or lower extremity edema.  She did note feeling her heartbeat more prominently when lying on her left side to fall asleep at night over the last couple of nights otherwise denies any sensation of palpitations or irregular heartbeat or heart racing.  Her blood pressure was initially significantly elevated in the clinic today at 186/73.  She tells me she has not been checking her blood pressure regularly at home, but she does have a home blood pressure monitor.    ASSESSMENT AND PLAN:  1.  Mild non-ischemic cardiomyopathy, suspected stress cardiomyopathy   - TTE 11/3/2020 showing EF 40 to 45% with moderate to severe hypokinesis of the mid anteroseptal wall as well as the mid to apical anterior wall.  Coronary angiogram 11/9/2020 showing mild, nonobstructive CAD.  - Suspect this is most likely a stress cardiomyopathy secondary to her recent episode of sepsis and UTI. Other differentials include a possible hypertensive cardiomyopathy.  - She appears euvolemic without signs or symptoms to suggest acute CHF. Continue current regimen of carvedilol 12.5 mg twice daily and lisinopril. We will plan for a repeat echocardiogram in about 2 to 3 months for reevaluation of her LV systolic function.    2. Recent admission due to E.Coli Urosepsis, resolved    3. Mild coronary artery disease  - As outlined above on recent coronary angiogram.  - Continue with current medical management with aspirin 81 mg daily, carvedilol 12.5 mg twice daily, and rosuvastatin 40 mg once daily.    4. Hypertension, suboptimally controlled  - Recommended that she increase the dose of her lisinopril from 20 mg to 40 mg once daily with a repeat basic metabolic panel in 1 to 2 weeks.  - I also advised her to avoid NSAIDs as possible and try using Tylenol as needed for management of her chronic back pain. We reviewed lifestyle modifications including trying to stick to a low-salt diet, get regular exercise, and  lose weight to help with blood pressure control as well. I instructed her to continue to monitor her blood pressure periodically at home and call the clinic if her blood pressures remain consistently elevated above 130/85 so further adjustments to her antihypertensive regimen can be made.    5. Dyslipidemia  - Treated on rosuvastatin 40 mg once daily as noted above. We will plan for repeat fasting lipid profile and ALT in 1 to 2 months.    Thank you for the opportunity to participate in this pleasant patient's care.  We will arrange for her to see Dr. Olvera in follow-up in 2 to 3 months with a repeat echocardiogram, fasting lipid profile, and ALT beforehand.  I encouraged her to call the clinic with any questions or concerns in the meantime, and we would be happy to arrange sooner follow-up if needed.    REMY Doll, CNP   Text Page  (8am - 5pm, M-F)    Orders this Visit:  No orders of the defined types were placed in this encounter.    Orders Placed This Encounter   Medications     ibuprofen (ADVIL/MOTRIN) 200 MG capsule     Sig: Take 200 mg by mouth every 4 hours as needed for fever     omeprazole (PRILOSEC OTC) 20 MG EC tablet     Sig: Take 20 mg by mouth daily     There are no discontinued medications.  Encounter Diagnoses   Name Primary?     Non-ischemic cardiomyopathy (H) Yes     Sepsis due to Escherichia coli, unspecified whether acute organ dysfunction present (H)      Mild coronary artery disease      Essential hypertension      Dyslipidemia      NSTEMI (non-ST elevated myocardial infarction) (H)      Sepsis (H)      CURRENT MEDICATIONS:  Current Outpatient Medications   Medication Sig Dispense Refill     aspirin (ASA) 81 MG EC tablet Take 81 mg by mouth daily       carvedilol (COREG) 12.5 MG tablet Take 1 tablet (12.5 mg) by mouth 2 times daily (with meals) 60 tablet 0     cyclobenzaprine (FLEXERIL) 10 MG tablet Take 10 mg by mouth daily as needed for muscle spasms       docusate sodium (COLACE)  100 MG capsule Take 100 mg by mouth daily as needed for constipation       ibuprofen (ADVIL/MOTRIN) 200 MG capsule Take 200 mg by mouth every 4 hours as needed for fever       lisinopril (ZESTRIL) 20 MG tablet Take 1 tablet (20 mg) by mouth daily 30 tablet 0     omeprazole (PRILOSEC OTC) 20 MG EC tablet Take 20 mg by mouth daily       rosuvastatin (CRESTOR) 40 MG tablet Take 1 tablet (40 mg) by mouth daily 30 tablet 0     ALLERGIES  Allergies   Allergen Reactions     Chlorthalidone GI Disturbance       PAST MEDICAL, SURGICAL, FAMILY HISTORY:  History was reviewed and updated as needed, see medical record.    SOCIAL HISTORY:  Social History     Socioeconomic History     Marital status:      Spouse name: Not on file     Number of children: Not on file     Years of education: Not on file     Highest education level: Not on file   Occupational History     Not on file   Social Needs     Financial resource strain: Not on file     Food insecurity     Worry: Not on file     Inability: Not on file     Transportation needs     Medical: Not on file     Non-medical: Not on file   Tobacco Use     Smoking status: Never Smoker     Smokeless tobacco: Never Used   Substance and Sexual Activity     Alcohol use: Never     Frequency: Never     Drug use: Not on file     Sexual activity: Not on file   Lifestyle     Physical activity     Days per week: Not on file     Minutes per session: Not on file     Stress: Not on file   Relationships     Social connections     Talks on phone: Not on file     Gets together: Not on file     Attends Zoroastrianism service: Not on file     Active member of club or organization: Not on file     Attends meetings of clubs or organizations: Not on file     Relationship status: Not on file     Intimate partner violence     Fear of current or ex partner: Not on file     Emotionally abused: Not on file     Physically abused: Not on file     Forced sexual activity: Not on file   Other Topics Concern      "Parent/sibling w/ CABG, MI or angioplasty before 65F 55M? Not Asked   Social History Narrative     Not on file     Review of Systems:  Skin:  Negative     Eyes:  Positive for glasses  ENT:  Negative    Respiratory:  Negative    Cardiovascular:    Positive for;palpitations  Gastroenterology: Positive for reflux  Genitourinary:  Negative    Musculoskeletal:  Positive for back pain;arthritis;neck pain  Neurologic:  Negative    Psychiatric:  Negative    Heme/Lymph/Imm:  Negative    Endocrine:  Positive for       Physical Exam:  Vitals: BP (!) 186/73 (BP Location: Right arm, Patient Position: Sitting, Cuff Size: Adult Regular)   Pulse 53   Ht 1.727 m (5' 8\")   Wt 72.6 kg (160 lb)   SpO2 97%   BMI 24.33 kg/m     Wt Readings from Last 4 Encounters:   11/24/20 72.6 kg (160 lb)   11/03/20 79.5 kg (175 lb 3.2 oz)     CONSTITUTIONAL: Appears her stated age, well nourished, and in no acute distress.  HEENT: Pupils equal, round. Sclerae nonicteric.    NECK: Supple, no masses or JVD appreciated..  C/V:  Regular rate and rhythm, normal S1 and S2, no S3 or S4, no murmur, rub or gallop.   RESP: Respirations are unlabored. Lungs are clear to auscultation bilaterally without wheezing, rales, or rhonchi.  GI: Abdomen soft, non-tender, non-distended.  EXTREM: No clubbing, cyanosis, or lower extremity edema bilaterally.   NEURO: Alert and oriented, cooperative. Gait steady. No gross focal deficits.   PSYCH: Affect appropriate. Mentation normal. Responds to questions appropriately.  SKIN: Warm and dry. No apparent rashes or bruising.     Recent Lab Results:  LIPID RESULTS:  No results found for: CHOL, HDL, LDL, TRIG, CHOLHDLRATIO  LIVER ENZYME RESULTS:  Lab Results   Component Value Date    AST 18 11/03/2020    ALT 18 11/03/2020     CBC RESULTS:  Lab Results   Component Value Date    WBC 4.4 11/09/2020    RBC 3.62 (L) 11/09/2020    HGB 10.3 (L) 11/09/2020    HCT 32.4 (L) 11/09/2020    MCV 90 11/09/2020    MCH 28.5 11/09/2020    MCHC " 31.8 11/09/2020    RDW 12.7 11/09/2020     11/09/2020     BMP RESULTS:  Lab Results   Component Value Date     11/23/2020    POTASSIUM 4.0 11/23/2020    CHLORIDE 109 11/23/2020    CO2 27 11/23/2020    ANIONGAP 4 11/23/2020    GLC 89 11/23/2020    BUN 23 11/23/2020    CR 0.98 11/23/2020    GFRESTIMATED 56 (L) 11/23/2020    GFRESTBLACK 65 11/23/2020    RIN 9.0 11/23/2020        This note was completed in part using Dragon voice recognition software. Although reviewed after completion, some word and grammatical errors may occur.    Thank you for allowing me to participate in the care of your patient.    Sincerely,     Jordan Mcdonough NP     [facility]

## 2020-12-02 ENCOUNTER — TELEPHONE (OUTPATIENT)
Dept: CARDIOLOGY | Facility: CLINIC | Age: 75
End: 2020-12-02

## 2020-12-02 DIAGNOSIS — I10 ESSENTIAL HYPERTENSION: ICD-10-CM

## 2020-12-02 DIAGNOSIS — I42.8 NON-ISCHEMIC CARDIOMYOPATHY (H): ICD-10-CM

## 2020-12-02 LAB
ANION GAP SERPL CALCULATED.3IONS-SCNC: 3 MMOL/L (ref 3–14)
BUN SERPL-MCNC: 27 MG/DL (ref 7–30)
CALCIUM SERPL-MCNC: 9.2 MG/DL (ref 8.5–10.1)
CHLORIDE SERPL-SCNC: 107 MMOL/L (ref 94–109)
CO2 SERPL-SCNC: 29 MMOL/L (ref 20–32)
CREAT SERPL-MCNC: 1 MG/DL (ref 0.52–1.04)
GFR SERPL CREATININE-BSD FRML MDRD: 55 ML/MIN/{1.73_M2}
GLUCOSE SERPL-MCNC: 95 MG/DL (ref 70–99)
POTASSIUM SERPL-SCNC: 3.9 MMOL/L (ref 3.4–5.3)
SODIUM SERPL-SCNC: 139 MMOL/L (ref 133–144)

## 2020-12-02 PROCEDURE — 80048 BASIC METABOLIC PNL TOTAL CA: CPT | Performed by: NURSE PRACTITIONER

## 2020-12-02 PROCEDURE — 36415 COLL VENOUS BLD VENIPUNCTURE: CPT | Performed by: NURSE PRACTITIONER

## 2020-12-02 NOTE — TELEPHONE ENCOUNTER
Left detailed message on private v.m. with BMP results per Gerald. Patient to call Team 3 if any questions or concerns.        Please update the patient that her lab results show stable kidney function and electrolytes on the higher dose of lisinopril. Follow up as planned with Dr. Olvera. Thank you! Gerald Mcdonough, REMY, CNP

## 2021-01-25 ENCOUNTER — HOSPITAL ENCOUNTER (OUTPATIENT)
Dept: CARDIOLOGY | Facility: CLINIC | Age: 76
Discharge: HOME | End: 2021-01-25
Attending: NURSE PRACTITIONER | Admitting: NURSE PRACTITIONER
Payer: MEDICARE

## 2021-01-25 DIAGNOSIS — E78.5 DYSLIPIDEMIA: ICD-10-CM

## 2021-01-25 DIAGNOSIS — A41.51 SEPSIS DUE TO ESCHERICHIA COLI, UNSPECIFIED WHETHER ACUTE ORGAN DYSFUNCTION PRESENT (H): ICD-10-CM

## 2021-01-25 DIAGNOSIS — I25.10 MILD CORONARY ARTERY DISEASE: ICD-10-CM

## 2021-01-25 DIAGNOSIS — I42.8 NON-ISCHEMIC CARDIOMYOPATHY (H): ICD-10-CM

## 2021-01-25 DIAGNOSIS — I10 ESSENTIAL HYPERTENSION: ICD-10-CM

## 2021-01-25 DIAGNOSIS — I21.4 NSTEMI (NON-ST ELEVATED MYOCARDIAL INFARCTION) (H): ICD-10-CM

## 2021-01-25 PROCEDURE — 93325 DOPPLER ECHO COLOR FLOW MAPG: CPT

## 2021-01-25 PROCEDURE — 93325 DOPPLER ECHO COLOR FLOW MAPG: CPT | Mod: 26 | Performed by: INTERNAL MEDICINE

## 2021-01-25 PROCEDURE — 93308 TTE F-UP OR LMTD: CPT | Mod: 26 | Performed by: INTERNAL MEDICINE

## 2021-01-25 PROCEDURE — 93321 DOPPLER ECHO F-UP/LMTD STD: CPT | Mod: 26 | Performed by: INTERNAL MEDICINE

## 2021-02-01 DIAGNOSIS — E78.5 DYSLIPIDEMIA: ICD-10-CM

## 2021-02-01 DIAGNOSIS — I21.4 NSTEMI (NON-ST ELEVATED MYOCARDIAL INFARCTION) (H): ICD-10-CM

## 2021-02-01 LAB
ALT SERPL W P-5'-P-CCNC: 13 U/L (ref 0–50)
CHOLEST SERPL-MCNC: 152 MG/DL
HDLC SERPL-MCNC: 72 MG/DL
LDLC SERPL CALC-MCNC: 65 MG/DL
NONHDLC SERPL-MCNC: 80 MG/DL
TRIGL SERPL-MCNC: 73 MG/DL

## 2021-02-01 PROCEDURE — 80061 LIPID PANEL: CPT | Performed by: NURSE PRACTITIONER

## 2021-02-01 PROCEDURE — 36415 COLL VENOUS BLD VENIPUNCTURE: CPT | Performed by: NURSE PRACTITIONER

## 2021-02-01 PROCEDURE — 84460 ALANINE AMINO (ALT) (SGPT): CPT | Performed by: NURSE PRACTITIONER

## 2021-02-02 ENCOUNTER — OFFICE VISIT (OUTPATIENT)
Dept: CARDIOLOGY | Facility: CLINIC | Age: 76
End: 2021-02-02
Attending: NURSE PRACTITIONER
Payer: MEDICARE

## 2021-02-02 VITALS
HEIGHT: 68 IN | WEIGHT: 163 LBS | OXYGEN SATURATION: 100 % | SYSTOLIC BLOOD PRESSURE: 174 MMHG | HEART RATE: 52 BPM | DIASTOLIC BLOOD PRESSURE: 75 MMHG | BODY MASS INDEX: 24.71 KG/M2

## 2021-02-02 DIAGNOSIS — I25.10 MILD CORONARY ARTERY DISEASE: ICD-10-CM

## 2021-02-02 DIAGNOSIS — A41.51 SEPSIS DUE TO ESCHERICHIA COLI, UNSPECIFIED WHETHER ACUTE ORGAN DYSFUNCTION PRESENT (H): ICD-10-CM

## 2021-02-02 DIAGNOSIS — E78.5 DYSLIPIDEMIA: ICD-10-CM

## 2021-02-02 DIAGNOSIS — I42.8 NON-ISCHEMIC CARDIOMYOPATHY (H): ICD-10-CM

## 2021-02-02 DIAGNOSIS — I10 ESSENTIAL HYPERTENSION: ICD-10-CM

## 2021-02-02 DIAGNOSIS — I21.4 NSTEMI (NON-ST ELEVATED MYOCARDIAL INFARCTION) (H): ICD-10-CM

## 2021-02-02 PROCEDURE — 99214 OFFICE O/P EST MOD 30 MIN: CPT | Performed by: INTERNAL MEDICINE

## 2021-02-02 RX ORDER — AMLODIPINE BESYLATE 10 MG/1
5 TABLET ORAL DAILY
Qty: 90 TABLET | Refills: 11 | Status: SHIPPED | OUTPATIENT
Start: 2021-02-02 | End: 2021-04-13

## 2021-02-02 ASSESSMENT — MIFFLIN-ST. JEOR: SCORE: 1277.86

## 2021-02-02 NOTE — LETTER
2/2/2021    Shannan Jeffrey MD  [facility] [address]  [facility] [address]    RE: Hue Montanez       Dear Colleague,    I had the pleasure of seeing Hue Montanez in the [facility].    HISTORY OF PRESENT ILLNESS:  Has been under emotional stress with husbands illness dx cancer.   Anemia ? Hemorrhoids.  Elevated bp    Orders this Visit:  No orders of the defined types were placed in this encounter.    No orders of the defined types were placed in this encounter.    There are no discontinued medications.    Encounter Diagnoses   Name Primary?     Non-ischemic cardiomyopathy (H)      Sepsis due to Escherichia coli, unspecified whether acute organ dysfunction present (H)      Mild coronary artery disease      Essential hypertension      Dyslipidemia      NSTEMI (non-ST elevated myocardial infarction) (H)        CURRENT MEDICATIONS:  Current Outpatient Medications   Medication Sig Dispense Refill     aspirin (ASA) 81 MG EC tablet Take 81 mg by mouth daily       carvedilol (COREG) 12.5 MG tablet Take 1 tablet (12.5 mg) by mouth 2 times daily (with meals) 60 tablet 0     cyclobenzaprine (FLEXERIL) 10 MG tablet Take 10 mg by mouth daily as needed for muscle spasms       docusate sodium (COLACE) 100 MG capsule Take 100 mg by mouth daily as needed for constipation       ibuprofen (ADVIL/MOTRIN) 200 MG capsule Take 200 mg by mouth every 4 hours as needed for fever       lisinopril (ZESTRIL) 40 MG tablet Take 1 tablet (40 mg) by mouth daily 90 tablet 3     omeprazole (PRILOSEC OTC) 20 MG EC tablet Take 20 mg by mouth as needed        rosuvastatin (CRESTOR) 40 MG tablet Take 1 tablet (40 mg) by mouth daily 30 tablet 0       ALLERGIES     Allergies   Allergen Reactions     Chlorthalidone GI Disturbance       PAST MEDICAL, SURGICAL, FAMILY, SOCIAL HISTORY:  History was reviewed and updated as needed, see medical record.    Review of Systems:  A 12-point  "review of systems was completed, see medical record for detailed review of systems information.    Physical Exam:  Vitals: BP (!) 174/75 (BP Location: Right arm, Patient Position: Sitting, Cuff Size: Adult Regular)   Pulse 52   Ht 1.727 m (5' 8\")   Wt 73.9 kg (163 lb)   SpO2 100%   BMI 24.78 kg/m      Constitutional:           Skin:           Head:           Eyes:           ENT:           Neck:           Chest:           Cardiac:                    Abdomen:           Vascular:                                        Extremities and Back:           Neurological:           ASSESSMENT: follow-up anemia with pmd       RECOMMENDATIONS:   Amlodipine 10 daily ( does not like diuretics)  See fredis in one month      Recent Lab Results:  LIPID RESULTS:  Lab Results   Component Value Date    CHOL 152 02/01/2021    HDL 72 02/01/2021    LDL 65 02/01/2021    TRIG 73 02/01/2021       LIVER ENZYME RESULTS:  Lab Results   Component Value Date    AST 18 11/03/2020    ALT 13 02/01/2021       CBC RESULTS:  Lab Results   Component Value Date    WBC 4.4 11/09/2020    RBC 3.62 (L) 11/09/2020    HGB 10.3 (L) 11/09/2020    HCT 32.4 (L) 11/09/2020    MCV 90 11/09/2020    MCH 28.5 11/09/2020    MCHC 31.8 11/09/2020    RDW 12.7 11/09/2020     11/09/2020       BMP RESULTS:  Lab Results   Component Value Date     12/02/2020    POTASSIUM 3.9 12/02/2020    CHLORIDE 107 12/02/2020    CO2 29 12/02/2020    ANIONGAP 3 12/02/2020    GLC 95 12/02/2020    BUN 27 12/02/2020    CR 1.00 12/02/2020    GFRESTIMATED 55 (L) 12/02/2020    GFRESTBLACK 63 12/02/2020    RIN 9.2 12/02/2020        A1C RESULTS:  No results found for: A1C    INR RESULTS:  No results found for: INR    We greatly appreciate the opportunity to be involved in the care of your patient, Hue Montanez.    Sincerely,  Yaniv Olvera MD      CC  Jordan Mcdonough, CHANDAN  [address] [place] [address]                                      "                                    Thank you for allowing me to participate in the care of your patient.      Sincerely,     Yaniv Olvera MD     [facility]    cc:   Jordan Mcdonough NP  [address] [address]

## 2021-02-02 NOTE — LETTER
2/2/2021      Shannan Jeffrey MD  [facility] [address]  [facility] [address]      RE: Hue Montanez       Dear Colleague,    I had the pleasure of seeing Hue Montanez in the [facility].    Service Date: 02/02/2021      HISTORY OF PRESENT ILLNESS:  Hue Montanez, a 75-year-old woman with history of recent stress cardiomyopathy, hypertension, irritable bowel syndrome, GERD, hemorrhoids, hypertension and dyslipidemia was seen today at your request for followup of a recent hospitalization.      In 11/2020, I saw the patient in consultation for a non-ST-segment elevation myocardial infarction in the setting of urinary tract infection.  The patient's echo had shown a mid anterior wall region of hypokinesis with ejection fraction of 45%-50%.  Diagnostic coronary angiography on 11/09/2020 showed atheromatous changes with no significant focal narrowing.  Our diagnosis was stress rate-related cardiomyopathy.  A subsequent echos since her discharge has shown that her ejection fraction is now normal and there has been no regional wall motion abnormality.  The patient remains entirely free of chest discomfort.      Ms. Montanez has been under a great deal of stress recently because of her 's recent diagnosis of cancer.  She and her  will meet with the oncologist tomorrow.      The patient has a history of  hemorrhoids have been slightly more active recently.  I noted her most recent hemoglobin was down from 12 to 10.3     The patient has a history of hypertension, but has not tolerated diuretic therapy in the past because it worsens irritable bowel syndrome and causes constipation.       PAST MEDICAL HISTORY:   1.  Non-ST-segment elevation MI, felt to be a stress cardiomyopathy in setting of urosepsis.  Mid anterior wall region of hypokinesis with ejection fraction of 40%.  Followup echo shows normal ejection fraction without regional  wall motion abnormality, no chest pain.   2.  Hypertension   3.  Nonobstructive coronary artery disease.   4.  Dyslipidemia.   5.  Anemia.  Hemoglobin 10.3 noted in 11/2020.  No recent followup.  Previous hemoglobin had been 12.1.      MEDICATIONS:   1.  Aspirin 81 mg daily.   2.  Carvedilol 12.5 mg b.i.d.   3.  Lisinopril 40 mg daily.   4.  Rosuvastatin 40 mg daily.   5.  Omeprazole 20 mg daily.      PHYSICAL EXAMINATION:   GENERAL:  Exam today demonstrates a very pleasant, cooperative and anxious 75-year-old woman.   VITAL SIGNS:  Her blood pressure was 174/75 and was 165/75, on repeat.  Her heart rate is 52.  Her height is 1.73 meters, her weight is 70.9 kg.  BMI is 24.8.   LUNGS:  Clear to percussion and auscultation.   CARDIOVASCULAR:  Shows a normal S1 with a normal S2.  There is a soft S4.  There is no S3.  There is no murmur, rub or click.      LABORATORY STUDIES:  Echocardiogram from 01/25/2021 shows a normal ejection fraction with no regional wall motion abnormalities and no valvular insufficiency.  There has been a marked improvement in ejection fraction and wall motion since her last echo.      Hemoglobin is 10.3, potassium levels 3.9, creatinine 1.0.      Her most recent LDL cholesterol was 65.      ASSESSMENT:  Ms. Montanez's blood pressure remains suboptimally controlled.  I would like to add a diuretic, but the patient is concerned about  worsening constipation, thus we will add amlodipine 10 mg daily.      I have requested the patient returned to the office for repeat blood pressure check in about 1 month.      In the meantime, I would request that her primary care doctor address the patient's anemia.  The patient does have a history of gastroesophageal reflux disease and hemorrhoids in the past.      RECOMMENDATIONS:   1.  Add amlodipine 10 mg daily.   2.  Continue Mediterranean-style diet.   3.  Regular exercise program.   4.  Followup visit in about 1 month with advanced level practitioner at  that time to recheck blood pressure.   5.  Followup of anemia with primary care doctor.      We greatly appreciate the opportunity to participate in the care of your patient, Maria De Jesus Ortiz.      cc:      Shannan Jeffrey MD    [facility]   [address] [place] [address]         YANIV OLVERA MD        D: 2021   T: 2021   MT:       Name:     SHAWNA ORTIZ   MRN:      [id number]        Account:      [id number]   :      1945           Service Date: 2021      Document: [id number]      Outpatient Encounter Medications as of 2021   Medication Sig Dispense Refill     amLODIPine (NORVASC) 10 MG tablet Take 0.5 tablets (5 mg) by mouth daily 90 tablet 11     aspirin (ASA) 81 MG EC tablet Take 81 mg by mouth daily       carvedilol (COREG) 12.5 MG tablet Take 1 tablet (12.5 mg) by mouth 2 times daily (with meals) 60 tablet 0     cyclobenzaprine (FLEXERIL) 10 MG tablet Take 10 mg by mouth daily as needed for muscle spasms       docusate sodium (COLACE) 100 MG capsule Take 100 mg by mouth daily as needed for constipation       ibuprofen (ADVIL/MOTRIN) 200 MG capsule Take 200 mg by mouth every 4 hours as needed for fever       lisinopril (ZESTRIL) 40 MG tablet Take 1 tablet (40 mg) by mouth daily 90 tablet 3     omeprazole (PRILOSEC OTC) 20 MG EC tablet Take 20 mg by mouth as needed        rosuvastatin (CRESTOR) 40 MG tablet Take 1 tablet (40 mg) by mouth daily 30 tablet 0     No facility-administered encounter medications on file as of 2021.      Again, thank you for allowing me to participate in the care of your patient.      Sincerely,    Yaniv Olvera MD     [facility]

## 2021-02-02 NOTE — PROGRESS NOTES
"HISTORY OF PRESENT ILLNESS:  Has been under emotional stress with husbands illness dx cancer.   Anemia ? Hemorrhoids.  Elevated bp    Orders this Visit:  No orders of the defined types were placed in this encounter.    No orders of the defined types were placed in this encounter.    There are no discontinued medications.    Encounter Diagnoses   Name Primary?     Non-ischemic cardiomyopathy (H)      Sepsis due to Escherichia coli, unspecified whether acute organ dysfunction present (H)      Mild coronary artery disease      Essential hypertension      Dyslipidemia      NSTEMI (non-ST elevated myocardial infarction) (H)        CURRENT MEDICATIONS:  Current Outpatient Medications   Medication Sig Dispense Refill     aspirin (ASA) 81 MG EC tablet Take 81 mg by mouth daily       carvedilol (COREG) 12.5 MG tablet Take 1 tablet (12.5 mg) by mouth 2 times daily (with meals) 60 tablet 0     cyclobenzaprine (FLEXERIL) 10 MG tablet Take 10 mg by mouth daily as needed for muscle spasms       docusate sodium (COLACE) 100 MG capsule Take 100 mg by mouth daily as needed for constipation       ibuprofen (ADVIL/MOTRIN) 200 MG capsule Take 200 mg by mouth every 4 hours as needed for fever       lisinopril (ZESTRIL) 40 MG tablet Take 1 tablet (40 mg) by mouth daily 90 tablet 3     omeprazole (PRILOSEC OTC) 20 MG EC tablet Take 20 mg by mouth as needed        rosuvastatin (CRESTOR) 40 MG tablet Take 1 tablet (40 mg) by mouth daily 30 tablet 0       ALLERGIES     Allergies   Allergen Reactions     Chlorthalidone GI Disturbance       PAST MEDICAL, SURGICAL, FAMILY, SOCIAL HISTORY:  History was reviewed and updated as needed, see medical record.    Review of Systems:  A 12-point review of systems was completed, see medical record for detailed review of systems information.    Physical Exam:  Vitals: BP (!) 174/75 (BP Location: Right arm, Patient Position: Sitting, Cuff Size: Adult Regular)   Pulse 52   Ht 1.727 m (5' 8\")   Wt 73.9 kg " (163 lb)   SpO2 100%   BMI 24.78 kg/m      Constitutional:           Skin:           Head:           Eyes:           ENT:           Neck:           Chest:           Cardiac:                    Abdomen:           Vascular:                                        Extremities and Back:           Neurological:           ASSESSMENT: follow-up anemia with pmd       RECOMMENDATIONS:   Amlodipine 10 daily ( does not like diuretics)  See fredis in one month      Recent Lab Results:  LIPID RESULTS:  Lab Results   Component Value Date    CHOL 152 02/01/2021    HDL 72 02/01/2021    LDL 65 02/01/2021    TRIG 73 02/01/2021       LIVER ENZYME RESULTS:  Lab Results   Component Value Date    AST 18 11/03/2020    ALT 13 02/01/2021       CBC RESULTS:  Lab Results   Component Value Date    WBC 4.4 11/09/2020    RBC 3.62 (L) 11/09/2020    HGB 10.3 (L) 11/09/2020    HCT 32.4 (L) 11/09/2020    MCV 90 11/09/2020    MCH 28.5 11/09/2020    MCHC 31.8 11/09/2020    RDW 12.7 11/09/2020     11/09/2020       BMP RESULTS:  Lab Results   Component Value Date     12/02/2020    POTASSIUM 3.9 12/02/2020    CHLORIDE 107 12/02/2020    CO2 29 12/02/2020    ANIONGAP 3 12/02/2020    GLC 95 12/02/2020    BUN 27 12/02/2020    CR 1.00 12/02/2020    GFRESTIMATED 55 (L) 12/02/2020    GFRESTBLACK 63 12/02/2020    RIN 9.2 12/02/2020        A1C RESULTS:  No results found for: A1C    INR RESULTS:  No results found for: INR    We greatly appreciate the opportunity to be involved in the care of your patient, Hue Montanez.    Sincerely,  Yaniv Olvera MD      CC  Jordan Mcdonough, NP  [address] [place] [address]

## 2021-02-02 NOTE — PROGRESS NOTES
Service Date: 02/02/2021      HISTORY OF PRESENT ILLNESS:  Hue Montanez, a 75-year-old woman with history of recent stress cardiomyopathy, hypertension, irritable bowel syndrome, GERD, hemorrhoids, hypertension and dyslipidemia was seen today at your request for followup of a recent hospitalization.      In 11/2020, I saw the patient in consultation for a non-ST-segment elevation myocardial infarction in the setting of urinary tract infection.  The patient's echo had shown a mid anterior wall region of hypokinesis with ejection fraction of 45%-50%.  Diagnostic coronary angiography on 11/09/2020 showed atheromatous changes with no significant focal narrowing.  Our diagnosis was stress rate-related cardiomyopathy.  A subsequent echos since her discharge has shown that her ejection fraction is now normal and there has been no regional wall motion abnormality.  The patient remains entirely free of chest discomfort.      Ms. Montanez has been under a great deal of stress recently because of her 's recent diagnosis of cancer.  She and her  will meet with the oncologist tomorrow.      The patient has a history of  hemorrhoids have been slightly more active recently.  I noted her most recent hemoglobin was down from 12 to 10.3     The patient has a history of hypertension, but has not tolerated diuretic therapy in the past because it worsens irritable bowel syndrome and causes constipation.       PAST MEDICAL HISTORY:   1.  Non-ST-segment elevation MI, felt to be a stress cardiomyopathy in setting of urosepsis.  Mid anterior wall region of hypokinesis with ejection fraction of 40%.  Followup echo shows normal ejection fraction without regional wall motion abnormality, no chest pain.   2.  Hypertension   3.  Nonobstructive coronary artery disease.   4.  Dyslipidemia.   5.  Anemia.  Hemoglobin 10.3 noted in 11/2020.  No recent followup.  Previous hemoglobin had been 12.1.      MEDICATIONS:   1.  Aspirin  81 mg daily.   2.  Carvedilol 12.5 mg b.i.d.   3.  Lisinopril 40 mg daily.   4.  Rosuvastatin 40 mg daily.   5.  Omeprazole 20 mg daily.      PHYSICAL EXAMINATION:   GENERAL:  Exam today demonstrates a very pleasant, cooperative and anxious 75-year-old woman.   VITAL SIGNS:  Her blood pressure was 174/75 and was 165/75, on repeat.  Her heart rate is 52.  Her height is 1.73 meters, her weight is 70.9 kg.  BMI is 24.8.   LUNGS:  Clear to percussion and auscultation.   CARDIOVASCULAR:  Shows a normal S1 with a normal S2.  There is a soft S4.  There is no S3.  There is no murmur, rub or click.      LABORATORY STUDIES:  Echocardiogram from 01/25/2021 shows a normal ejection fraction with no regional wall motion abnormalities and no valvular insufficiency.  There has been a marked improvement in ejection fraction and wall motion since her last echo.      Hemoglobin is 10.3, potassium levels 3.9, creatinine 1.0.      Her most recent LDL cholesterol was 65.      ASSESSMENT:  Ms. Montanez's blood pressure remains suboptimally controlled.  I would like to add a diuretic, but the patient is concerned about  worsening constipation, thus we will add amlodipine 10 mg daily.      I have requested the patient returned to the office for repeat blood pressure check in about 1 month.      In the meantime, I would request that her primary care doctor address the patient's anemia.  The patient does have a history of gastroesophageal reflux disease and hemorrhoids in the past.      RECOMMENDATIONS:   1.  Add amlodipine 10 mg daily.   2.  Continue Mediterranean-style diet.   3.  Regular exercise program.   4.  Followup visit in about 1 month with advanced level practitioner at that time to recheck blood pressure.   5.  Followup of anemia with primary care doctor.      We greatly appreciate the opportunity to participate in the care of your patient, Maria De Jesus Montanez.      cc:      Shannan Jeffrey MD    [facility]    [address] [place] [address]         BEE GAYTAN MD             D: 2021   T: 2021   MT: BRANDI      Name:     SHAWNA ORTIZ   MRN:      [id number]        Account:      [id number]   :      1945           Service Date: 2021      Document: [id number]

## 2021-04-12 NOTE — PROGRESS NOTES
Cardiology Clinic Progress Note    Service Date: April 13, 2021    Primary Cardiologist: Dr. Olvera      Reason for visit: [facility] Clinic follow up    HPI:   I had the pleasure of seeing Ms. Hue Montanez in the clinic again today. As you know, she is a delightful and lively 75 year old female with a past medical history notable for IBS, GERD, hypertension, dyslipidemia, and previous MI in 2014 with coronary angiography showing no significant coronary narrowings. She was admitted to [facility] this past fall on 11/3/2020 after presenting with shaking chills, nausea, and vomiting. She was found to have E.Coli urosepsis and was treated with IV antibiotics. She was found to have a NSTEMI with a troponin elevation to a peak of 8.4 and trended down. A new cardiomyopathy was also found with an echocardiogram on 11/03/2020 showing EF 45-50% with moderate to severe hypokineis of the mid anteroseptal wall as well as the mid to apcial anterior wall. This was felt to be a possible stress cardiomyopathy in the setting of urosepsis. Coronary angiography was completed on 11/09/2020 demonstrating non-occlusive coronary artery disease with a 20% LAD stenosis and 10% proximal RCA stenosis.    I met her initially in late November 2020 in follow up of her hospitalization. She was doing fairly well at that time, but was quite hypertensive in the clinic with a blood pressure of 186/73. I recommended she increase lisinopril from 20 mg to  40 mg once daily for better blood pressure control. She was otherwise continued on a regimen of aspirin 81 mg daily, carvedilol 12.5 mg twice daily, and rosuvastatin 40 mg once daily. A repeat echocardiogram was arranged in 2-3 months for reassessment of her LV function. This was completed on 01/25/2021 showing normalization of her ejection fraction at 55-60% and resolution of the previously described wall motion abnormalities.    She was seen by Dr. Olvera in follow up  after the echocardiogram on 02/02/2021. She had been under a great deal of stress around that time because her  had recently been diagnosed with cancer. Her blood pressure remained sub-optimally controlled at that time. Dr. Olvera discussed adding a diuretic, but the patient was concerned that this would worsen her irritable bowel syndrome so she was ultimately started on amlodipine 10 mg daily for better blood pressure control. She was noted to be mildly anemic with a Hemoglobin of 10.3 in 11/2020 which had not been followed up on. Previously her hemoglobin had been around  12.1. Follow up with her primary care provider was recommended for this. She was otherwise doing well from a cardiac standpoint at that time.    Today,  Ms. Montanez presents to the clinic in routine follow up primarily for reassessment of her blood pressure after being started on amlodipine. She tells me that she unfortunately developed lower extremity edema after starting the amlodipine. She has had some musculoskeletal issues with her ankle in the past and feels that the swelling has worsened this so she expresses quite clearly that she does not want to take the amlodipine anymore. She did not take her dose this morning prior to the visit. Her blood pressure remains elevated in the clinic today at 162/78. Beyond continued elevated blood pressures, Hue tells me that she has been feeling reasonably well. Her  has been battling cancer so this has been stressful for her.     The patient and her  are planning to move full time to [place], [place] in about 1 month to be closer to their son who lives nearby there. She notes somewhat in jest that the move is mainly her 's wish and she obliged with it as long as they found a place with a pool so that she can get some respite from the heat. They apparently succeeded with this and she is looking forward to hopefully staying more physically active with some  exercises in the pool that will be easier on her joints. She has struggled with chronic low back and rib pain for which she has regularly been seeing a chiropractor. This issues have been exacerbated recently by lifting and moving boxes as they prepare for the move. She has been fairly regularly taking ibuprofen to help with management of her aches and pains. We reviewed that the NSAID use could be contributing to her somewhat challenging to control hypertension. We discussed trying to use tylenol in place of ibuprofen which she is resistant to as she does not feel that this has worked as well for her pain management as ibuprofen has.    Aside from her musculoskeletal issues, she denies concerns from a cardiac standpoint at this time. She has not had chest pain, shortness of breath, palpitations, or lightheadedness. She has not been checking her blood pressure regularly at home as she states she grew tired of seeing it run consistently high in spite of the medication adjustments. She notes that with her IBS she previously has not tolerated diuretics well as this dried her out and made her constipated. She previously has tried chlorthalidone and hydrochlorothiazide.    ASSESSMENT AND PLAN:  1. Stress cardiomyopathy, resolved  - TTE 11/3/2020 showing EF 40-45% in the setting of a UTI and sepsis with coronary angiography showing mild, nonobstructive CAD.  - Overall presentation consistent with stress cardiomyopathy given normalization of EF to 55-60% with resolution of wall motion abnormalities on repeat TTE in January 2021.    2. Mild coronary artery disease  - As outlined above on coronary angiogram in 11/2020.  - She is stable without anginal symptoms at this time. Continue with current medical management with aspirin 81 mg daily, carvedilol 12.5 mg twice daily, and rosuvastatin 40 mg once daily.    3. Hypertension  - Blood pressures remain sub-optimally controlled on her current regimen of lisinopril 40 mg once  daily, and carvedilol 12.5 mg BID. She did not tolerate amlodipine with development of LE edema.  - We discussed options for further antihypertensive medications, including addition of hydralazine versus another trial of hydrochlorothiazide. She noted that when she was previously on hydrochlorothiazide she had not been taking any stool softeners for her IBS and she now takes stool softeners intermittently and feels that her IBS is under better control she would like to give hydrochlorothiazide another try. We will try hydrochlorothiazide at 25 mg once daily and plan for a basic metabolic panel in about 1 week after starting it.   - Again, recommended avoiding NSAIDs as possible and trying to substitute Tylenol as needed for management of her chronic back pain. We also reviewed lifestyle modifications including trying to stick to a low-salt diet, get regular exercise, and lose weight to help with blood pressure control as well. I instructed her to continue to monitor her blood pressure periodically at home and call the clinic if her blood pressures remain consistently elevated above 130/85 so further adjustments can be made if needed.    4. Dyslipidemia  - Treated on rosuvastatin 40 mg once daily as noted above.    Thank you for the opportunity to participate in this very nice patient's care. We will check a basic metabolic panel in 1 week after starting hydrochlorothiazide as noted above. We will update her on these results by phone and can follow up on her blood pressure readings at that time as well.  Since she will be moving to [place] in about a month, she is planning to establish cardiology and primary care there once she has gotten settled in. I wished her the best with the move and encouraged her to call the clinic with any questions or concerns in the meantime. We would be happy to arrange follow up on an as-needed basis going forward.    REMY Doll, CNP   Nurse Practitioner  [facility]  Care  Pager: [phone]  Text Page  (8am - 5pm, M-F)    Orders this Visit:  Orders Placed This Encounter   Procedures     Basic metabolic panel     Orders Placed This Encounter   Medications     hydrochlorothiazide (HYDRODIURIL) 25 MG tablet     Sig: Take 1 tablet (25 mg) by mouth daily     Dispense:  30 tablet     Refill:  5     Medications Discontinued During This Encounter   Medication Reason     amLODIPine (NORVASC) 10 MG tablet Unavailable     Encounter Diagnoses   Name Primary?     Non-ischemic cardiomyopathy (H) Yes     Mild coronary artery disease      Essential hypertension      Dyslipidemia      CURRENT MEDICATIONS:  Current Outpatient Medications   Medication Sig Dispense Refill     aspirin (ASA) 81 MG EC tablet Take 81 mg by mouth daily       carvedilol (COREG) 12.5 MG tablet Take 1 tablet (12.5 mg) by mouth 2 times daily (with meals) 60 tablet 0     cyclobenzaprine (FLEXERIL) 10 MG tablet Take 10 mg by mouth daily as needed for muscle spasms       docusate sodium (COLACE) 100 MG capsule Take 100 mg by mouth daily as needed for constipation       hydrochlorothiazide (HYDRODIURIL) 25 MG tablet Take 1 tablet (25 mg) by mouth daily 30 tablet 5     ibuprofen (ADVIL/MOTRIN) 200 MG capsule Take 200 mg by mouth every 4 hours as needed for fever       lisinopril (ZESTRIL) 40 MG tablet Take 1 tablet (40 mg) by mouth daily 90 tablet 3     omeprazole (PRILOSEC OTC) 20 MG EC tablet Take 20 mg by mouth as needed        rosuvastatin (CRESTOR) 40 MG tablet Take 1 tablet (40 mg) by mouth daily 30 tablet 0     ALLERGIES  Allergies   Allergen Reactions     Chlorthalidone GI Disturbance       PAST MEDICAL, SURGICAL, FAMILY HISTORY:  History was reviewed and updated as needed, see medical record.    SOCIAL HISTORY:  Social History     Socioeconomic History     Marital status:      Spouse name: Not on file     Number of children: Not on file     Years of education: Not on file     Highest education level: Not on  "file   Occupational History     Not on file   Social Needs     Financial resource strain: Not on file     Food insecurity     Worry: Not on file     Inability: Not on file     Transportation needs     Medical: Not on file     Non-medical: Not on file   Tobacco Use     Smoking status: Never Smoker     Smokeless tobacco: Never Used   Substance and Sexual Activity     Alcohol use: Never     Frequency: Never     Drug use: Not on file     Sexual activity: Not on file   Lifestyle     Physical activity     Days per week: Not on file     Minutes per session: Not on file     Stress: Not on file   Relationships     Social connections     Talks on phone: Not on file     Gets together: Not on file     Attends Rastafarian service: Not on file     Active member of club or organization: Not on file     Attends meetings of clubs or organizations: Not on file     Relationship status: Not on file     Intimate partner violence     Fear of current or ex partner: Not on file     Emotionally abused: Not on file     Physically abused: Not on file     Forced sexual activity: Not on file   Other Topics Concern     Parent/sibling w/ CABG, MI or angioplasty before 65F 55M? Not Asked   Social History Narrative     Not on file     Review of Systems:  Skin:  Negative     Eyes:  Positive for glasses  ENT:  Negative    Respiratory:       Cardiovascular:    Positive for;edema  Gastroenterology: Positive for reflux  Genitourinary:  not assessed    Musculoskeletal:  Positive for back pain  Neurologic:  Positive for headaches  Psychiatric:  Positive for excessive stress  Heme/Lymph/Imm:  Negative    Endocrine:  Positive for       Physical Exam:  Vitals: BP (!) 162/78   Pulse 54   Ht 1.727 m (5' 8\")   Wt 82.6 kg (182 lb 3.2 oz)   BMI 27.70 kg/m     Wt Readings from Last 4 Encounters:   04/13/21 82.6 kg (182 lb 3.2 oz)   02/02/21 73.9 kg (163 lb)   11/24/20 72.6 kg (160 lb)   11/03/20 79.5 kg (175 lb 3.2 oz)     CONSTITUTIONAL: Appears her stated age, " well nourished, and in no acute distress.  HEENT: Pupils equal, round. Sclerae nonicteric.    NECK: Supple, no masses or JVD appreciated..  C/V:  Regular rate and rhythm, normal S1 and S2, no S3 or S4, no murmur, rub or gallop.   RESP: Respirations are unlabored. Lungs are clear to auscultation bilaterally without wheezing, rales, or rhonchi.  GI: Abdomen soft, non-tender, non-distended.  EXTREM: 1+ lower extremity edema bilaterally. No clubbing or cyanosis.   NEURO: Alert and oriented, cooperative. Gait steady. No gross focal deficits.   PSYCH: Affect appropriate, bright. Mentation normal. Responds to questions appropriately.  SKIN: Warm and dry. No apparent rashes or bruising.     Recent Lab Results:  LIPID RESULTS:  Lab Results   Component Value Date    CHOL 152 02/01/2021    HDL 72 02/01/2021    LDL 65 02/01/2021    TRIG 73 02/01/2021     LIVER ENZYME RESULTS:  Lab Results   Component Value Date    AST 18 11/03/2020    ALT 13 02/01/2021     CBC RESULTS:  Lab Results   Component Value Date    WBC 4.4 11/09/2020    RBC 3.62 (L) 11/09/2020    HGB 10.3 (L) 11/09/2020    HCT 32.4 (L) 11/09/2020    MCV 90 11/09/2020    MCH 28.5 11/09/2020    MCHC 31.8 11/09/2020    RDW 12.7 11/09/2020     11/09/2020     BMP RESULTS:  Lab Results   Component Value Date     12/02/2020    POTASSIUM 3.9 12/02/2020    CHLORIDE 107 12/02/2020    CO2 29 12/02/2020    ANIONGAP 3 12/02/2020    GLC 95 12/02/2020    BUN 27 12/02/2020    CR 1.00 12/02/2020    GFRESTIMATED 55 (L) 12/02/2020    GFRESTBLACK 63 12/02/2020    RIN 9.2 12/02/2020      This note was completed in part using Dragon voice recognition software. Although reviewed after completion, some word and grammatical errors may occur.

## 2021-04-12 NOTE — PATIENT INSTRUCTIONS
Thank you for your visit with the [facility] today.    Today's plan:   1. Stop amlodipine.  2. Start taking hydrochlorothiazide 25 mg once daily for better blood pressure control.  3. Check labs in about 1 week to recheck you potassium and kidney function.  4. Continue to monitor you blood pressure periodically. We're shooting for more consistently under 130/85.   5. Try to take ibuprofen less frequently. You can use tylenol rather than ibuprofen as needed for pain.    If you have questions or concerns, please do not hesitate to call my nurse Damaris at [phone]    Scheduling phone number: [phone]    It was a pleasure seeing you today!     Gerald Mcdonough, Nurse Practitioner  [facility]  April 13, 2021  ________________________________________________________

## 2021-04-13 ENCOUNTER — OFFICE VISIT (OUTPATIENT)
Dept: CARDIOLOGY | Facility: CLINIC | Age: 76
End: 2021-04-13
Attending: INTERNAL MEDICINE
Payer: MEDICARE

## 2021-04-13 VITALS
DIASTOLIC BLOOD PRESSURE: 78 MMHG | BODY MASS INDEX: 27.61 KG/M2 | HEART RATE: 54 BPM | SYSTOLIC BLOOD PRESSURE: 162 MMHG | HEIGHT: 68 IN | WEIGHT: 182.2 LBS

## 2021-04-13 DIAGNOSIS — E78.5 DYSLIPIDEMIA: ICD-10-CM

## 2021-04-13 DIAGNOSIS — I10 ESSENTIAL HYPERTENSION: ICD-10-CM

## 2021-04-13 DIAGNOSIS — I42.8 NON-ISCHEMIC CARDIOMYOPATHY (H): Primary | ICD-10-CM

## 2021-04-13 DIAGNOSIS — I25.10 MILD CORONARY ARTERY DISEASE: ICD-10-CM

## 2021-04-13 PROCEDURE — 99214 OFFICE O/P EST MOD 30 MIN: CPT | Performed by: NURSE PRACTITIONER

## 2021-04-13 RX ORDER — HYDROCHLOROTHIAZIDE 25 MG/1
25 TABLET ORAL DAILY
Qty: 30 TABLET | Refills: 5 | Status: SHIPPED | OUTPATIENT
Start: 2021-04-13

## 2021-04-13 ASSESSMENT — MIFFLIN-ST. JEOR: SCORE: 1364.95

## 2021-04-13 NOTE — LETTER
4/13/2021    [facility]  [address]  [facility] [address]    RE: Hue Montanez       Dear Colleague,    I had the pleasure of seeing Hue Montanez in the [facility].  Cardiology Clinic Progress Note    Service Date: April 13, 2021    Primary Cardiologist: Dr. Olvera      Reason for visit: CORE Clinic follow up    HPI:   I had the pleasure of seeing Ms. Hue Montanez in the clinic again today. As you know, she is a delightful and lively 75 year old female with a past medical history notable for IBS, GERD, hypertension, dyslipidemia, and previous MI in 2014 with coronary angiography showing no significant coronary narrowings. She was admitted to [facility] this past fall on 11/3/2020 after presenting with shaking chills, nausea, and vomiting. She was found to have E.Coli urosepsis and was treated with IV antibiotics. She was found to have a NSTEMI with a troponin elevation to a peak of 8.4 and trended down. A new cardiomyopathy was also found with an echocardiogram on 11/03/2020 showing EF 45-50% with moderate to severe hypokineis of the mid anteroseptal wall as well as the mid to apcial anterior wall. This was felt to be a possible stress cardiomyopathy in the setting of urosepsis. Coronary angiography was completed on 11/09/2020 demonstrating non-occlusive coronary artery disease with a 20% LAD stenosis and 10% proximal RCA stenosis.    I met her initially in late November 2020 in follow up of her hospitalization. She was doing fairly well at that time, but was quite hypertensive in the clinic with a blood pressure of 186/73. I recommended she increase lisinopril from 20 mg to  40 mg once daily for better blood pressure control. She was otherwise continued on a regimen of aspirin 81 mg daily, carvedilol 12.5 mg twice daily, and rosuvastatin 40 mg once daily. A repeat echocardiogram was  arranged in 2-3 months for reassessment of her LV function. This was completed on 01/25/2021 showing normalization of her ejection fraction at 55-60% and resolution of the previously described wall motion abnormalities.    She was seen by Dr. Olvera in follow up after the echocardiogram on 02/02/2021. She had been under a great deal of stress around that time because her  had recently been diagnosed with cancer. Her blood pressure remained sub-optimally controlled at that time. Dr. Olvera discussed adding a diuretic, but the patient was concerned that this would worsen her irritable bowel syndrome so she was ultimately started on amlodipine 10 mg daily for better blood pressure control. She was noted to be mildly anemic with a Hemoglobin of 10.3 in 11/2020 which had not been followed up on. Previously her hemoglobin had been around  12.1. Follow up with her primary care provider was recommended for this. She was otherwise doing well from a cardiac standpoint at that time.    Today,  Ms. Montanez presents to the clinic in routine follow up primarily for reassessment of her blood pressure after being started on amlodipine. She tells me that she unfortunately developed lower extremity edema after starting the amlodipine. She has had some musculoskeletal issues with her ankle in the past and feels that the swelling has worsened this so she expresses quite clearly that she does not want to take the amlodipine anymore. She did not take her dose this morning prior to the visit. Her blood pressure remains elevated in the clinic today at 162/78. Beyond continued elevated blood pressures, Hue tells me that she has been feeling reasonably well. Her  has been battling cancer so this has been stressful for her.     The patient and her  are planning to move full time to [place], [place] in about 1 month to be closer to their son who lives nearby there. She notes somewhat in jest that the move  is mainly her 's wish and she obliged with it as long as they found a place with a pool so that she can get some respite from the heat. They apparently succeeded with this and she is looking forward to hopefully staying more physically active with some exercises in the pool that will be easier on her joints. She has struggled with chronic low back and rib pain for which she has regularly been seeing a chiropractor. This issues have been exacerbated recently by lifting and moving boxes as they prepare for the move. She has been fairly regularly taking ibuprofen to help with management of her aches and pains. We reviewed that the NSAID use could be contributing to her somewhat challenging to control hypertension. We discussed trying to use tylenol in place of ibuprofen which she is resistant to as she does not feel that this has worked as well for her pain management as ibuprofen has.    Aside from her musculoskeletal issues, she denies concerns from a cardiac standpoint at this time. She has not had chest pain, shortness of breath, palpitations, or lightheadedness. She has not been checking her blood pressure regularly at home as she states she grew tired of seeing it run consistently high in spite of the medication adjustments. She notes that with her IBS she previously has not tolerated diuretics well as this dried her out and made her constipated. She previously has tried chlorthalidone and hydrochlorothiazide.    ASSESSMENT AND PLAN:  1. Stress cardiomyopathy, resolved  - TTE 11/3/2020 showing EF 40-45% in the setting of a UTI and sepsis with coronary angiography showing mild, nonobstructive CAD.  - Overall presentation consistent with stress cardiomyopathy given normalization of EF to 55-60% with resolution of wall motion abnormalities on repeat TTE in January 2021.    2. Mild coronary artery disease  - As outlined above on coronary angiogram in 11/2020.  - She is stable without anginal symptoms at this  time. Continue with current medical management with aspirin 81 mg daily, carvedilol 12.5 mg twice daily, and rosuvastatin 40 mg once daily.    3. Hypertension  - Blood pressures remain sub-optimally controlled on her current regimen of lisinopril 40 mg once daily, and carvedilol 12.5 mg BID. She did not tolerate amlodipine with development of LE edema.  - We discussed options for further antihypertensive medications, including addition of hydralazine versus another trial of hydrochlorothiazide. She noted that when she was previously on hydrochlorothiazide she had not been taking any stool softeners for her IBS and she now takes stool softeners intermittently and feels that her IBS is under better control she would like to give hydrochlorothiazide another try. We will try hydrochlorothiazide at 25 mg once daily and plan for a basic metabolic panel in about 1 week after starting it.   - Again, recommended avoiding NSAIDs as possible and trying to substitute Tylenol as needed for management of her chronic back pain. We also reviewed lifestyle modifications including trying to stick to a low-salt diet, get regular exercise, and lose weight to help with blood pressure control as well. I instructed her to continue to monitor her blood pressure periodically at home and call the clinic if her blood pressures remain consistently elevated above 130/85 so further adjustments can be made if needed.    4. Dyslipidemia  - Treated on rosuvastatin 40 mg once daily as noted above.    Thank you for the opportunity to participate in this very nice patient's care. We will check a basic metabolic panel in 1 week after starting hydrochlorothiazide as noted above. We will update her on these results by phone and can follow up on her blood pressure readings at that time as well.  Since she will be moving to [place] in about a month, she is planning to establish cardiology and primary care there once she has gotten settled in. I wished her  the best with the move and encouraged her to call the clinic with any questions or concerns in the meantime. We would be happy to arrange follow up on an as-needed basis going forward.    REMY Doll, CNP   Nurse Practitioner  [facility]  Pager: [phone]  Text Page  (8am - 5pm, M-F)    Orders this Visit:  Orders Placed This Encounter   Procedures     Basic metabolic panel     Orders Placed This Encounter   Medications     hydrochlorothiazide (HYDRODIURIL) 25 MG tablet     Sig: Take 1 tablet (25 mg) by mouth daily     Dispense:  30 tablet     Refill:  5     Medications Discontinued During This Encounter   Medication Reason     amLODIPine (NORVASC) 10 MG tablet Unavailable     Encounter Diagnoses   Name Primary?     Non-ischemic cardiomyopathy (H) Yes     Mild coronary artery disease      Essential hypertension      Dyslipidemia      CURRENT MEDICATIONS:  Current Outpatient Medications   Medication Sig Dispense Refill     aspirin (ASA) 81 MG EC tablet Take 81 mg by mouth daily       carvedilol (COREG) 12.5 MG tablet Take 1 tablet (12.5 mg) by mouth 2 times daily (with meals) 60 tablet 0     cyclobenzaprine (FLEXERIL) 10 MG tablet Take 10 mg by mouth daily as needed for muscle spasms       docusate sodium (COLACE) 100 MG capsule Take 100 mg by mouth daily as needed for constipation       hydrochlorothiazide (HYDRODIURIL) 25 MG tablet Take 1 tablet (25 mg) by mouth daily 30 tablet 5     ibuprofen (ADVIL/MOTRIN) 200 MG capsule Take 200 mg by mouth every 4 hours as needed for fever       lisinopril (ZESTRIL) 40 MG tablet Take 1 tablet (40 mg) by mouth daily 90 tablet 3     omeprazole (PRILOSEC OTC) 20 MG EC tablet Take 20 mg by mouth as needed        rosuvastatin (CRESTOR) 40 MG tablet Take 1 tablet (40 mg) by mouth daily 30 tablet 0     ALLERGIES  Allergies   Allergen Reactions     Chlorthalidone GI Disturbance       PAST MEDICAL, SURGICAL, FAMILY HISTORY:  History was reviewed and updated as  "needed, see medical record.    SOCIAL HISTORY:  Social History     Socioeconomic History     Marital status:      Spouse name: Not on file     Number of children: Not on file     Years of education: Not on file     Highest education level: Not on file   Occupational History     Not on file   Social Needs     Financial resource strain: Not on file     Food insecurity     Worry: Not on file     Inability: Not on file     Transportation needs     Medical: Not on file     Non-medical: Not on file   Tobacco Use     Smoking status: Never Smoker     Smokeless tobacco: Never Used   Substance and Sexual Activity     Alcohol use: Never     Frequency: Never     Drug use: Not on file     Sexual activity: Not on file   Lifestyle     Physical activity     Days per week: Not on file     Minutes per session: Not on file     Stress: Not on file   Relationships     Social connections     Talks on phone: Not on file     Gets together: Not on file     Attends [employer] service: Not on file     Active member of club or organization: Not on file     Attends meetings of clubs or organizations: Not on file     Relationship status: Not on file     Intimate partner violence     Fear of current or ex partner: Not on file     Emotionally abused: Not on file     Physically abused: Not on file     Forced sexual activity: Not on file   Other Topics Concern     Parent/sibling w/ CABG, MI or angioplasty before 65F 55M? Not Asked   Social History Narrative     Not on file     Review of Systems:  Skin:  Negative     Eyes:  Positive for glasses  ENT:  Negative    Respiratory:       Cardiovascular:    Positive for;edema  Gastroenterology: Positive for reflux  Genitourinary:  not assessed    Musculoskeletal:  Positive for back pain  Neurologic:  Positive for headaches  Psychiatric:  Positive for excessive stress  Heme/Lymph/Imm:  Negative    Endocrine:  Positive for       Physical Exam:  Vitals: BP (!) 162/78   Pulse 54   Ht 1.727 m (5' 8\")   " Wt 82.6 kg (182 lb 3.2 oz)   BMI 27.70 kg/m     Wt Readings from Last 4 Encounters:   04/13/21 82.6 kg (182 lb 3.2 oz)   02/02/21 73.9 kg (163 lb)   11/24/20 72.6 kg (160 lb)   11/03/20 79.5 kg (175 lb 3.2 oz)     CONSTITUTIONAL: Appears her stated age, well nourished, and in no acute distress.  HEENT: Pupils equal, round. Sclerae nonicteric.    NECK: Supple, no masses or JVD appreciated..  C/V:  Regular rate and rhythm, normal S1 and S2, no S3 or S4, no murmur, rub or gallop.   RESP: Respirations are unlabored. Lungs are clear to auscultation bilaterally without wheezing, rales, or rhonchi.  GI: Abdomen soft, non-tender, non-distended.  EXTREM: 1+ lower extremity edema bilaterally. No clubbing or cyanosis.   NEURO: Alert and oriented, cooperative. Gait steady. No gross focal deficits.   PSYCH: Affect appropriate, bright. Mentation normal. Responds to questions appropriately.  SKIN: Warm and dry. No apparent rashes or bruising.     Recent Lab Results:  LIPID RESULTS:  Lab Results   Component Value Date    CHOL 152 02/01/2021    HDL 72 02/01/2021    LDL 65 02/01/2021    TRIG 73 02/01/2021     LIVER ENZYME RESULTS:  Lab Results   Component Value Date    AST 18 11/03/2020    ALT 13 02/01/2021     CBC RESULTS:  Lab Results   Component Value Date    WBC 4.4 11/09/2020    RBC 3.62 (L) 11/09/2020    HGB 10.3 (L) 11/09/2020    HCT 32.4 (L) 11/09/2020    MCV 90 11/09/2020    MCH 28.5 11/09/2020    MCHC 31.8 11/09/2020    RDW 12.7 11/09/2020     11/09/2020     BMP RESULTS:  Lab Results   Component Value Date     12/02/2020    POTASSIUM 3.9 12/02/2020    CHLORIDE 107 12/02/2020    CO2 29 12/02/2020    ANIONGAP 3 12/02/2020    GLC 95 12/02/2020    BUN 27 12/02/2020    CR 1.00 12/02/2020    GFRESTIMATED 55 (L) 12/02/2020    GFRESTBLACK 63 12/02/2020    RIN 9.2 12/02/2020      This note was completed in part using Dragon voice recognition software. Although reviewed after completion, some word and grammatical errors  may occur.    Thank you for allowing me to participate in the care of your patient.      Sincerely,     Jordan Mcdonough NP     [facility]    cc:   Yaniv Olvera MD  [address]  [place] [id number]

## 2021-04-19 ENCOUNTER — TELEPHONE (OUTPATIENT)
Dept: CARDIOLOGY | Facility: CLINIC | Age: 76
End: 2021-04-19

## 2021-04-19 NOTE — TELEPHONE ENCOUNTER
----- Message from Cheryl Foy RN sent at 4/19/2021  2:29 PM CDT -----  Regarding: needs BMP order sent to [facility]  [facility]   Due this week .  Fax number [phone]

## 2021-04-27 ENCOUNTER — TELEPHONE (OUTPATIENT)
Dept: CARDIOLOGY | Facility: CLINIC | Age: 76
End: 2021-04-27

## 2021-04-27 DIAGNOSIS — I21.4 NSTEMI (NON-ST ELEVATED MYOCARDIAL INFARCTION) (H): Primary | ICD-10-CM

## 2021-04-27 NOTE — TELEPHONE ENCOUNTER
Hue was seen by Gerald on 4/13 with medication changes:     Today's plan:   1. Stop amlodipine.  2. Start taking hydrochlorothiazide 25 mg once daily for better blood pressure control.  3. Check labs in about 1 week to recheck you potassium and kidney function.  4. Continue to monitor you blood pressure periodically. We're shooting for more consistently under 130/85.   5. Try to take ibuprofen less frequently. You can use tylenol rather than ibuprofen as needed for pain.      Spoke to Carline. She just returned from [place]. Her sister passed away. Carline has not been checking her BP due to traveling. She has made the medication changes. She will check her BP and call back. She is feeling well overall.   She has an appt at her PCP ( [facility]) on 4/29 and will have BMP drawn.     Damaris Alamo RN 9:42 AM 04/27/21

## 2021-04-30 NOTE — TELEPHONE ENCOUNTER
Called to [facility] to see if Hue was at her appt 4/29 and if BMP was drawn. They sent me to medical records who directed me to the nursing team. I was transferred back to  who tried to send me back to MR. I did let them know that MR directed me back to nursing team.  will send message to Dr Cedeno's team for a call back.     Damaris Alamo RN 9:58 AM 04/30/21

## 2021-06-02 ENCOUNTER — RECORDS - HEALTHEAST (OUTPATIENT)
Dept: ADMINISTRATIVE | Facility: CLINIC | Age: 76
End: 2021-06-02

## 2022-05-27 NOTE — PROGRESS NOTES
Cardiology Clinic Progress Note    Service Date: November 24, 2020    PRIMARY CARDIOLOGIST: Dr. Olvera      REASON FOR VISIT: Hospital follow up    HPI:   I had the pleasure of seeing Ms. Hue Montanez in the clinic today. She is a very pleasant 75 year old female with a past medical history notable for IBS, GERD, hypertension, dyslipidemia, and previous MI in 2014 with coronary angiography showing no significant coronary narrowings. She was recently admitted to [facility] on 11/3/2020 after presenting with shaking chills, nausea, and vomiting. She was found to have E. Coli urosepsis and was treated with IV antibiotics. She was found to have a NSTEMI with a troponin elevation to a peak of 8.4 and trended down. A new cardiomyopathy was also found with an echocardiogram on 11/03/2020 showing EF 45-50% with moderate to severe hypokineis of the mid anteroseptal wall as well as the mid to apcial anterior wall. This was felt to be a possible stress cardiomyopathy in the setting of urosepsis. Coronary angiography was completed on 11/09/2020 demonstrating non-occlusive coronary artery disease with a 20% LAD stenosis and 10% proximal RCA stenosis.    Today, she presents to the clinic in follow up of her recent hospitalization.  She tells me that she has been feeling okay since she has been home from the hospital.  She has struggled with chronic low back pain and feels that this has been worse following her hospital stay.  She was recently seen by her chiropractor and feels like this helped some.  She has also been using ibuprofen as needed with some relief.  She otherwise denies chest, neck, jaw, or arm pain.  She has not had shortness of breath, dyspnea on exertion, orthopnea, PND or lower extremity edema.  She did note feeling her heartbeat more prominently when lying on her left side to fall asleep at night over the last couple of nights otherwise denies any sensation of palpitations or  Transport here to transfer pt to [facility], report called to facility, iv dc'd abbocath intact.   Electronically signed by Leighann Daniel RN on 4/18/2019 at 3:29 PM Type of surgery: ROBOTIC ASSISTED RIGHT INGUINAL HERNIA REPAIR WITH MESH  Location of surgery: [facility]  Date and time of surgery: 6/24/2022 @ 8:15 AM   Surgeon: MADONNA MARQUEZ MD    Pre-Op Appt Date: PATIENT TO SCHEDULE    Post-Op Appt Date: PATIENT TO SCHEDULE     Packet sent out: Yes  Pre-cert/Authorization completed:  Not Applicable  Date: 5/27/2022       ROBOTIC ASSISTED RIGHT INGUINAL HERNIA REPAIR WITH MESH     GENERAL PT INST TO HAVE H&P WITH DR DE OLIVEIRA 90 MIN REQ PA ASSIST RMO NMS      irregular heartbeat or heart racing.  Her blood pressure was initially significantly elevated in the clinic today at 186/73.  She tells me she has not been checking her blood pressure regularly at home, but she does have a home blood pressure monitor.    ASSESSMENT AND PLAN:  1.  Mild non-ischemic cardiomyopathy, suspected stress cardiomyopathy   - TTE 11/3/2020 showing EF 40 to 45% with moderate to severe hypokinesis of the mid anteroseptal wall as well as the mid to apical anterior wall.  Coronary angiogram 11/9/2020 showing mild, nonobstructive CAD.  - Suspect this is most likely a stress cardiomyopathy secondary to her recent episode of sepsis and UTI. Other differentials include a possible hypertensive cardiomyopathy.  - She appears euvolemic without signs or symptoms to suggest acute CHF. Continue current regimen of carvedilol 12.5 mg twice daily and lisinopril. We will plan for a repeat echocardiogram in about 2 to 3 months for reevaluation of her LV systolic function.    2. Recent admission due to E.Coli Urosepsis, resolved    3. Mild coronary artery disease  - As outlined above on recent coronary angiogram.  - Continue with current medical management with aspirin 81 mg daily, carvedilol 12.5 mg twice daily, and rosuvastatin 40 mg once daily.    4. Hypertension, suboptimally controlled  - Recommended that she increase the dose of her lisinopril from 20 mg to 40 mg once daily with a repeat basic metabolic panel in 1 to 2 weeks.  - I also advised her to avoid NSAIDs as possible and try using Tylenol as needed for management of her chronic back pain. We reviewed lifestyle modifications including trying to stick to a low-salt diet, get regular exercise, and lose weight to help with blood pressure control as well. I instructed her to continue to monitor her blood pressure periodically at home and call the clinic if her blood pressures remain consistently elevated above 130/85 so further adjustments to her  antihypertensive regimen can be made.    5. Dyslipidemia  - Treated on rosuvastatin 40 mg once daily as noted above. We will plan for repeat fasting lipid profile and ALT in 1 to 2 months.    Thank you for the opportunity to participate in this pleasant patient's care.  We will arrange for her to see Dr. Olvera in follow-up in 2 to 3 months with a repeat echocardiogram, fasting lipid profile, and ALT beforehand.  I encouraged her to call the clinic with any questions or concerns in the meantime, and we would be happy to arrange sooner follow-up if needed.    REMY Doll, CNP   Text Page  (8am - 5pm, M-F)    Orders this Visit:  No orders of the defined types were placed in this encounter.    Orders Placed This Encounter   Medications     ibuprofen (ADVIL/MOTRIN) 200 MG capsule     Sig: Take 200 mg by mouth every 4 hours as needed for fever     omeprazole (PRILOSEC OTC) 20 MG EC tablet     Sig: Take 20 mg by mouth daily     There are no discontinued medications.  Encounter Diagnoses   Name Primary?     Non-ischemic cardiomyopathy (H) Yes     Sepsis due to Escherichia coli, unspecified whether acute organ dysfunction present (H)      Mild coronary artery disease      Essential hypertension      Dyslipidemia      NSTEMI (non-ST elevated myocardial infarction) (H)      Sepsis (H)      CURRENT MEDICATIONS:  Current Outpatient Medications   Medication Sig Dispense Refill     aspirin (ASA) 81 MG EC tablet Take 81 mg by mouth daily       carvedilol (COREG) 12.5 MG tablet Take 1 tablet (12.5 mg) by mouth 2 times daily (with meals) 60 tablet 0     cyclobenzaprine (FLEXERIL) 10 MG tablet Take 10 mg by mouth daily as needed for muscle spasms       docusate sodium (COLACE) 100 MG capsule Take 100 mg by mouth daily as needed for constipation       ibuprofen (ADVIL/MOTRIN) 200 MG capsule Take 200 mg by mouth every 4 hours as needed for fever       lisinopril (ZESTRIL) 20 MG tablet Take 1 tablet (20 mg) by mouth daily 30  tablet 0     omeprazole (PRILOSEC OTC) 20 MG EC tablet Take 20 mg by mouth daily       rosuvastatin (CRESTOR) 40 MG tablet Take 1 tablet (40 mg) by mouth daily 30 tablet 0     ALLERGIES  Allergies   Allergen Reactions     Chlorthalidone GI Disturbance       PAST MEDICAL, SURGICAL, FAMILY HISTORY:  History was reviewed and updated as needed, see medical record.    SOCIAL HISTORY:  Social History     Socioeconomic History     Marital status:      Spouse name: Not on file     Number of children: Not on file     Years of education: Not on file     Highest education level: Not on file   Occupational History     Not on file   Social Needs     Financial resource strain: Not on file     Food insecurity     Worry: Not on file     Inability: Not on file     Transportation needs     Medical: Not on file     Non-medical: Not on file   Tobacco Use     Smoking status: Never Smoker     Smokeless tobacco: Never Used   Substance and Sexual Activity     Alcohol use: Never     Frequency: Never     Drug use: Not on file     Sexual activity: Not on file   Lifestyle     Physical activity     Days per week: Not on file     Minutes per session: Not on file     Stress: Not on file   Relationships     Social connections     Talks on phone: Not on file     Gets together: Not on file     Attends Worship service: Not on file     Active member of club or organization: Not on file     Attends meetings of clubs or organizations: Not on file     Relationship status: Not on file     Intimate partner violence     Fear of current or ex partner: Not on file     Emotionally abused: Not on file     Physically abused: Not on file     Forced sexual activity: Not on file   Other Topics Concern     Parent/sibling w/ CABG, MI or angioplasty before 65F 55M? Not Asked   Social History Narrative     Not on file     Review of Systems:  Skin:  Negative     Eyes:  Positive for glasses  ENT:  Negative    Respiratory:  Negative    Cardiovascular:    Positive  "for;palpitations  Gastroenterology: Positive for reflux  Genitourinary:  Negative    Musculoskeletal:  Positive for back pain;arthritis;neck pain  Neurologic:  Negative    Psychiatric:  Negative    Heme/Lymph/Imm:  Negative    Endocrine:  Positive for       Physical Exam:  Vitals: BP (!) 186/73 (BP Location: Right arm, Patient Position: Sitting, Cuff Size: Adult Regular)   Pulse 53   Ht 1.727 m (5' 8\")   Wt 72.6 kg (160 lb)   SpO2 97%   BMI 24.33 kg/m     Wt Readings from Last 4 Encounters:   11/24/20 72.6 kg (160 lb)   11/03/20 79.5 kg (175 lb 3.2 oz)     CONSTITUTIONAL: Appears her stated age, well nourished, and in no acute distress.  HEENT: Pupils equal, round. Sclerae nonicteric.    NECK: Supple, no masses or JVD appreciated..  C/V:  Regular rate and rhythm, normal S1 and S2, no S3 or S4, no murmur, rub or gallop.   RESP: Respirations are unlabored. Lungs are clear to auscultation bilaterally without wheezing, rales, or rhonchi.  GI: Abdomen soft, non-tender, non-distended.  EXTREM: No clubbing, cyanosis, or lower extremity edema bilaterally.   NEURO: Alert and oriented, cooperative. Gait steady. No gross focal deficits.   PSYCH: Affect appropriate. Mentation normal. Responds to questions appropriately.  SKIN: Warm and dry. No apparent rashes or bruising.     Recent Lab Results:  LIPID RESULTS:  No results found for: CHOL, HDL, LDL, TRIG, CHOLHDLRATIO  LIVER ENZYME RESULTS:  Lab Results   Component Value Date    AST 18 11/03/2020    ALT 18 11/03/2020     CBC RESULTS:  Lab Results   Component Value Date    WBC 4.4 11/09/2020    RBC 3.62 (L) 11/09/2020    HGB 10.3 (L) 11/09/2020    HCT 32.4 (L) 11/09/2020    MCV 90 11/09/2020    MCH 28.5 11/09/2020    MCHC 31.8 11/09/2020    RDW 12.7 11/09/2020     11/09/2020     BMP RESULTS:  Lab Results   Component Value Date     11/23/2020    POTASSIUM 4.0 11/23/2020    CHLORIDE 109 11/23/2020    CO2 27 11/23/2020    ANIONGAP 4 11/23/2020    GLC 89 11/23/2020 "    BUN 23 11/23/2020    CR 0.98 11/23/2020    GFRESTIMATED 56 (L) 11/23/2020    GFRESTBLACK 65 11/23/2020    RIN 9.0 11/23/2020        This note was completed in part using Dragon voice recognition software. Although reviewed after completion, some word and grammatical errors may occur.

## (undated) DEVICE — RAD G/W INQWIRE .035X260CM J-TIP EXCHANGE IQ35F260J1O5RS

## (undated) DEVICE — RAD INTRODUCER KIT MICRO 5FRX10CM .018 NITINOL G/W

## (undated) DEVICE — MANIFOLD KIT ANGIO AUTOMATED 014613

## (undated) DEVICE — CATH DIAG 4FR JL 4.5 538417

## (undated) DEVICE — GUIDEWIRE VASC 0.035INX150CM INQWIRE J TIP IQ35F150J3F/A

## (undated) DEVICE — DEFIB PRO-PADZ LVP LQD GEL ADULT 8900-2105-01

## (undated) DEVICE — KIT HAND CONTROL ANGIOTOUCH ACIST 65CM AT-P65

## (undated) DEVICE — INTRO GLIDESHEATH SLENDER 6FR 10X45CM 60-1060

## (undated) DEVICE — INTRO SHEATH 4FRX10CM PINNACLE RSS402

## (undated) DEVICE — CATH ANGIO INFINITI JR4 4FRX100CM 538421